# Patient Record
Sex: MALE | Race: WHITE | Employment: OTHER | URBAN - METROPOLITAN AREA
[De-identification: names, ages, dates, MRNs, and addresses within clinical notes are randomized per-mention and may not be internally consistent; named-entity substitution may affect disease eponyms.]

---

## 2019-02-08 ENCOUNTER — HOSPITAL ENCOUNTER (EMERGENCY)
Age: 64
Discharge: LEFT BEFORE DISCHARGE | DRG: 690 | End: 2019-02-08
Attending: EMERGENCY MEDICINE
Payer: MEDICARE

## 2019-02-08 VITALS
TEMPERATURE: 96.7 F | BODY MASS INDEX: 42.66 KG/M2 | HEIGHT: 72 IN | SYSTOLIC BLOOD PRESSURE: 118 MMHG | DIASTOLIC BLOOD PRESSURE: 54 MMHG | OXYGEN SATURATION: 92 % | WEIGHT: 315 LBS | RESPIRATION RATE: 16 BRPM | HEART RATE: 78 BPM

## 2019-02-08 DIAGNOSIS — R53.1 GENERALIZED WEAKNESS: Primary | ICD-10-CM

## 2019-02-08 PROCEDURE — 99283 EMERGENCY DEPT VISIT LOW MDM: CPT

## 2019-02-08 ASSESSMENT — ENCOUNTER SYMPTOMS
WHEEZING: 0
COLOR CHANGE: 0
ABDOMINAL PAIN: 0
SINUS PRESSURE: 0
RHINORRHEA: 0
CONSTIPATION: 0
COUGH: 0
SORE THROAT: 0
DIARRHEA: 0
VOMITING: 0
NAUSEA: 0
SHORTNESS OF BREATH: 0

## 2019-02-08 NOTE — ED NOTES
Bed: 17  Expected date:   Expected time:   Means of arrival:   Comments:  Chad López, [place]  02/08/19 1523

## 2019-02-08 NOTE — ED NOTES
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

## 2019-02-08 NOTE — ED PROVIDER NOTES
Attending Supervisory Note/Shared Visit   I have personally performed a face to face diagnostic evaluation on this patient. I have reviewed the mid-levels findings and agree.         (Please note that portions of this note were completed with a voice recognition program.  Efforts were made to edit the dictations but occasionally words are mis-transcribed.)    Abdiel Arauz MD  Attending Emergency Physician      Abdiel Arauz MD  02/08/19 5685

## 2019-02-09 NOTE — ED PROVIDER NOTES
[address] [place] [address] ED  eMERGENCY dEPARTMENT eNCOUnter      Pt Name: Efren Angeles  MRN: [id number]  [place] 1955  Date of evaluation: 2/8/2019  Provider: Argenis Lee NP, ROMEL - [place] [id number]       Chief Complaint   Patient presents with    Fatigue         HISTORY OF PRESENT ILLNESS  (Location/Symptom, Timing/Onset, Context/Setting, Quality, Duration, Modifying Factors, Severity.)   Efren Angeles is a 70 y.o. male who presents to the emergency department The day by private EMS for evaluation of this. The patient states that she was prior to arrival he was sitting in his chair and he could not stand up. He states that his legs were like Jell-O and he could not put any weight on them or stand up. He states they called 911 and they brought him to the emergency department for evaluation. He did not have any slurred speech. He denies any headache or blurred vision. He states that he has had right-sided facial droop since birth. To that upon arrival to the emergency room his weakness has completely resolved. HE is able to ambulate without any difficulty. Nursing Notes were reviewed. ALLERGIES     Vicodin [hydrocodone-acetaminophen]    CURRENT MEDICATIONS       Discharge Medication List as of 2/8/2019  4:24 PM      CONTINUE these medications which have NOT CHANGED    Details   meloxicam (MOBIC) 7.5 MG tablet Take 1 tablet by mouth daily, Disp-30 tablet, R-3      isosorbide mononitrate (IMDUR) 30 MG CR tablet Take 1 tablet by mouth daily, Disp-30 tablet, R-3      metoprolol (LOPRESSOR) 25 MG tablet Take 1 tablet by mouth 2 times daily, Disp-60 tablet, R-3      furosemide (LASIX) 20 MG tablet Take 1 tablet by mouth daily, Disp-60 tablet, R-3      !!  potassium chloride SA (K-DUR;KLOR-CON M) 20 MEQ tablet Take 1 tablet by mouth 2 times daily (with meals), Disp-60 tablet, R-3      DIGOXIN PO Take 0.125 mg by mouth daily      aspirin 81 MG tablet Take 1 tablet by mouth daily, program.  Efforts were made to edit the dictations but occasionally words are mis-transcribed.)    RCIHIE Bennett NP, APRN - CNP  Certified Nurse Practitioner            ROMEL Wright CNP  02/08/19 6217

## 2019-02-10 ENCOUNTER — APPOINTMENT (OUTPATIENT)
Dept: CT IMAGING | Age: 64
DRG: 690 | End: 2019-02-10
Payer: MEDICARE

## 2019-02-10 ENCOUNTER — APPOINTMENT (OUTPATIENT)
Dept: GENERAL RADIOLOGY | Age: 64
DRG: 690 | End: 2019-02-10
Payer: MEDICARE

## 2019-02-10 ENCOUNTER — HOSPITAL ENCOUNTER (INPATIENT)
Age: 64
LOS: 4 days | Discharge: HOME | DRG: 690 | End: 2019-02-14
Attending: EMERGENCY MEDICINE | Admitting: INTERNAL MEDICINE
Payer: MEDICARE

## 2019-02-10 DIAGNOSIS — E87.1 HYPONATREMIA: ICD-10-CM

## 2019-02-10 DIAGNOSIS — N39.0 URINARY TRACT INFECTION WITHOUT HEMATURIA, SITE UNSPECIFIED: Primary | ICD-10-CM

## 2019-02-10 DIAGNOSIS — I10 HYPERTENSION, UNSPECIFIED TYPE: ICD-10-CM

## 2019-02-10 DIAGNOSIS — E87.6 HYPOKALEMIA: ICD-10-CM

## 2019-02-10 DIAGNOSIS — I48.91 ATRIAL FIBRILLATION WITH RAPID VENTRICULAR RESPONSE (HCC): ICD-10-CM

## 2019-02-10 DIAGNOSIS — I48.20 CHRONIC ATRIAL FIBRILLATION (HCC): ICD-10-CM

## 2019-02-10 PROBLEM — R53.1 GENERALIZED WEAKNESS: Status: ACTIVE | Noted: 2019-02-10

## 2019-02-10 PROBLEM — R09.02 HYPOXEMIA: Status: ACTIVE | Noted: 2019-02-10

## 2019-02-10 PROBLEM — I48.92 ATRIAL FLUTTER WITH RAPID VENTRICULAR RESPONSE (HCC): Status: RESOLVED | Noted: 2019-02-10 | Resolved: 2019-02-10

## 2019-02-10 PROBLEM — N30.01 ACUTE CYSTITIS WITH HEMATURIA: Status: ACTIVE | Noted: 2019-02-10

## 2019-02-10 PROBLEM — I48.92 ATRIAL FLUTTER WITH RAPID VENTRICULAR RESPONSE (HCC): Status: ACTIVE | Noted: 2019-02-10

## 2019-02-10 PROBLEM — E83.42 HYPOMAGNESEMIA: Status: ACTIVE | Noted: 2019-02-10

## 2019-02-10 PROBLEM — D72.829 LEUKOCYTOSIS: Status: ACTIVE | Noted: 2019-02-10

## 2019-02-10 LAB
-: ABNORMAL
ABSOLUTE EOS #: 0 K/UL (ref 0–0.4)
ABSOLUTE IMMATURE GRANULOCYTE: ABNORMAL K/UL (ref 0–0.3)
ABSOLUTE LYMPH #: 0.3 K/UL (ref 1–4.8)
ABSOLUTE MONO #: 0.3 K/UL (ref 0.2–0.8)
ALBUMIN SERPL-MCNC: 3.8 G/DL (ref 3.5–5.2)
ALBUMIN/GLOBULIN RATIO: ABNORMAL (ref 1–2.5)
ALP BLD-CCNC: 95 U/L (ref 40–129)
ALT SERPL-CCNC: 17 U/L (ref 5–41)
AMORPHOUS: ABNORMAL
ANION GAP SERPL CALCULATED.3IONS-SCNC: 18 MMOL/L (ref 9–17)
AST SERPL-CCNC: 67 U/L
BACTERIA: ABNORMAL
BASOPHILS # BLD: 0 %
BASOPHILS ABSOLUTE: 0 K/UL (ref 0–0.2)
BILIRUB SERPL-MCNC: 3.35 MG/DL (ref 0.3–1.2)
BILIRUBIN URINE: NEGATIVE
BNP INTERPRETATION: ABNORMAL
BUN BLDV-MCNC: 19 MG/DL (ref 8–23)
BUN/CREAT BLD: 20 (ref 9–20)
CALCIUM SERPL-MCNC: 9.6 MG/DL (ref 8.6–10.4)
CASTS UA: ABNORMAL /LPF
CHLORIDE BLD-SCNC: 74 MMOL/L (ref 98–107)
CO2: 27 MMOL/L (ref 20–31)
COLOR: YELLOW
COMMENT UA: ABNORMAL
CREAT SERPL-MCNC: 0.97 MG/DL (ref 0.7–1.2)
CRYSTALS, UA: ABNORMAL /HPF
DIFFERENTIAL TYPE: ABNORMAL
DIGOXIN DATE LAST DOSE: ABNORMAL
DIGOXIN DOSE AMOUNT: ABNORMAL
DIGOXIN DOSE TIME: ABNORMAL
DIGOXIN LEVEL: <0.3 NG/ML (ref 0.5–2)
DIRECT EXAM: NORMAL
EOSINOPHILS RELATIVE PERCENT: 0 % (ref 1–4)
EPITHELIAL CELLS UA: ABNORMAL /HPF (ref 0–5)
FIO2: 21
GFR AFRICAN AMERICAN: >60 ML/MIN
GFR NON-AFRICAN AMERICAN: >60 ML/MIN
GFR SERPL CREATININE-BSD FRML MDRD: ABNORMAL ML/MIN/{1.73_M2}
GFR SERPL CREATININE-BSD FRML MDRD: ABNORMAL ML/MIN/{1.73_M2}
GLUCOSE BLD-MCNC: 128 MG/DL (ref 70–99)
GLUCOSE URINE: NEGATIVE
HCO3 VENOUS: 33.2 MMOL/L (ref 24–30)
HCT VFR BLD CALC: 39.5 % (ref 41–53)
HEMOGLOBIN: 13.7 G/DL (ref 13.5–17.5)
IMMATURE GRANULOCYTES: ABNORMAL %
KETONES, URINE: NEGATIVE
LACTIC ACID, SEPSIS WHOLE BLOOD: ABNORMAL MMOL/L (ref 0.5–1.9)
LACTIC ACID, SEPSIS WHOLE BLOOD: NORMAL MMOL/L (ref 0.5–1.9)
LACTIC ACID, SEPSIS: 1.7 MMOL/L (ref 0.5–1.9)
LACTIC ACID, SEPSIS: 3.2 MMOL/L (ref 0.5–1.9)
LEUKOCYTE ESTERASE, URINE: ABNORMAL
LIPASE: 22 U/L (ref 13–60)
LYMPHOCYTES # BLD: 2 % (ref 24–44)
Lab: NORMAL
MAGNESIUM: 1.4 MG/DL (ref 1.6–2.6)
MAGNESIUM: 1.4 MG/DL (ref 1.6–2.6)
MCH RBC QN AUTO: 32.3 PG (ref 26–34)
MCHC RBC AUTO-ENTMCNC: 34.6 G/DL (ref 31–37)
MCV RBC AUTO: 93.6 FL (ref 80–100)
MONOCYTES # BLD: 2 % (ref 1–7)
MORPHOLOGY: ABNORMAL
MUCUS: ABNORMAL
MYOGLOBIN: 1199 NG/ML (ref 28–72)
MYOGLOBIN: 1241 NG/ML (ref 28–72)
NEGATIVE BASE EXCESS, VEN: ABNORMAL (ref 0–2)
NITRITE, URINE: NEGATIVE
NRBC AUTOMATED: ABNORMAL PER 100 WBC
O2 DEVICE/FLOW/%: ABNORMAL
O2 SAT, VEN: 25 %
OTHER OBSERVATIONS UA: ABNORMAL
PATIENT TEMP: 100
PCO2, VEN: 48 MM HG (ref 39–55)
PDW BLD-RTO: 17.5 % (ref 11.5–14.5)
PH UA: 6 (ref 5–8)
PH VENOUS: 7.45 (ref 7.32–7.42)
PLATELET # BLD: 256 K/UL (ref 130–400)
PLATELET ESTIMATE: ABNORMAL
PMV BLD AUTO: 6.1 FL (ref 6–12)
PO2, VEN: 17 MM HG (ref 30–50)
POC PCO2 TEMP: ABNORMAL MM HG
POC PH TEMP: ABNORMAL
POC PO2 TEMP: ABNORMAL MM HG
POSITIVE BASE EXCESS, VEN: 9 (ref 0–2)
POTASSIUM SERPL-SCNC: 2.7 MMOL/L (ref 3.7–5.3)
POTASSIUM SERPL-SCNC: 2.9 MMOL/L (ref 3.7–5.3)
PRO-BNP: 4885 PG/ML
PROTEIN UA: ABNORMAL
RBC # BLD: 4.23 M/UL (ref 4.5–5.9)
RBC # BLD: ABNORMAL 10*6/UL
RBC UA: ABNORMAL /HPF (ref 0–2)
RENAL EPITHELIAL, UA: ABNORMAL /HPF
SEG NEUTROPHILS: 96 % (ref 36–66)
SEGMENTED NEUTROPHILS ABSOLUTE COUNT: 14.5 K/UL (ref 1.8–7.7)
SODIUM BLD-SCNC: 119 MMOL/L (ref 135–144)
SPECIFIC GRAVITY UA: 1.02 (ref 1–1.03)
SPECIMEN DESCRIPTION: NORMAL
TOTAL CK: 918 U/L (ref 39–308)
TOTAL CO2, VENOUS: 35 MMOL/L (ref 25–31)
TOTAL PROTEIN: 8.2 G/DL (ref 6.4–8.3)
TRICHOMONAS: ABNORMAL
TROPONIN INTERP: ABNORMAL
TROPONIN T: ABNORMAL NG/ML
TROPONIN, HIGH SENSITIVITY: 26 NG/L (ref 0–22)
TROPONIN, HIGH SENSITIVITY: 26 NG/L (ref 0–22)
TROPONIN, HIGH SENSITIVITY: 28 NG/L (ref 0–22)
TURBIDITY: ABNORMAL
URINE HGB: ABNORMAL
UROBILINOGEN, URINE: NORMAL
WBC # BLD: 15.1 K/UL (ref 3.5–11)
WBC # BLD: ABNORMAL 10*3/UL
WBC UA: ABNORMAL /HPF (ref 0–5)
YEAST: ABNORMAL

## 2019-02-10 PROCEDURE — 96365 THER/PROPH/DIAG IV INF INIT: CPT

## 2019-02-10 PROCEDURE — 84484 ASSAY OF TROPONIN QUANT: CPT

## 2019-02-10 PROCEDURE — 83880 ASSAY OF NATRIURETIC PEPTIDE: CPT

## 2019-02-10 PROCEDURE — 6370000000 HC RX 637 (ALT 250 FOR IP): Performed by: NURSE PRACTITIONER

## 2019-02-10 PROCEDURE — 84132 ASSAY OF SERUM POTASSIUM: CPT

## 2019-02-10 PROCEDURE — 83735 ASSAY OF MAGNESIUM: CPT

## 2019-02-10 PROCEDURE — 80053 COMPREHEN METABOLIC PANEL: CPT

## 2019-02-10 PROCEDURE — 2580000003 HC RX 258: Performed by: NURSE PRACTITIONER

## 2019-02-10 PROCEDURE — 6360000002 HC RX W HCPCS: Performed by: NURSE PRACTITIONER

## 2019-02-10 PROCEDURE — 82550 ASSAY OF CK (CPK): CPT

## 2019-02-10 PROCEDURE — 82803 BLOOD GASES ANY COMBINATION: CPT

## 2019-02-10 PROCEDURE — 81001 URINALYSIS AUTO W/SCOPE: CPT

## 2019-02-10 PROCEDURE — 87205 SMEAR GRAM STAIN: CPT

## 2019-02-10 PROCEDURE — 80162 ASSAY OF DIGOXIN TOTAL: CPT

## 2019-02-10 PROCEDURE — 85025 COMPLETE CBC W/AUTO DIFF WBC: CPT

## 2019-02-10 PROCEDURE — 99223 1ST HOSP IP/OBS HIGH 75: CPT | Performed by: NURSE PRACTITIONER

## 2019-02-10 PROCEDURE — 6360000004 HC RX CONTRAST MEDICATION: Performed by: NURSE PRACTITIONER

## 2019-02-10 PROCEDURE — 83874 ASSAY OF MYOGLOBIN: CPT

## 2019-02-10 PROCEDURE — 71045 X-RAY EXAM CHEST 1 VIEW: CPT

## 2019-02-10 PROCEDURE — 71260 CT THORAX DX C+: CPT

## 2019-02-10 PROCEDURE — 36415 COLL VENOUS BLD VENIPUNCTURE: CPT

## 2019-02-10 PROCEDURE — 96368 THER/DIAG CONCURRENT INF: CPT

## 2019-02-10 PROCEDURE — 51702 INSERT TEMP BLADDER CATH: CPT

## 2019-02-10 PROCEDURE — 87804 INFLUENZA ASSAY W/OPTIC: CPT

## 2019-02-10 PROCEDURE — 96375 TX/PRO/DX INJ NEW DRUG ADDON: CPT

## 2019-02-10 PROCEDURE — 87070 CULTURE OTHR SPECIMN AEROBIC: CPT

## 2019-02-10 PROCEDURE — 6360000002 HC RX W HCPCS: Performed by: EMERGENCY MEDICINE

## 2019-02-10 PROCEDURE — 2060000000 HC ICU INTERMEDIATE R&B

## 2019-02-10 PROCEDURE — 2500000003 HC RX 250 WO HCPCS: Performed by: EMERGENCY MEDICINE

## 2019-02-10 PROCEDURE — 87040 BLOOD CULTURE FOR BACTERIA: CPT

## 2019-02-10 PROCEDURE — 83605 ASSAY OF LACTIC ACID: CPT

## 2019-02-10 PROCEDURE — 83690 ASSAY OF LIPASE: CPT

## 2019-02-10 PROCEDURE — 99285 EMERGENCY DEPT VISIT HI MDM: CPT

## 2019-02-10 PROCEDURE — 93005 ELECTROCARDIOGRAM TRACING: CPT

## 2019-02-10 PROCEDURE — 2580000003 HC RX 258: Performed by: EMERGENCY MEDICINE

## 2019-02-10 RX ORDER — MAGNESIUM SULFATE 1 G/100ML
1 INJECTION INTRAVENOUS ONCE
Status: COMPLETED | OUTPATIENT
Start: 2019-02-10 | End: 2019-02-10

## 2019-02-10 RX ORDER — 0.9 % SODIUM CHLORIDE 0.9 %
1000 INTRAVENOUS SOLUTION INTRAVENOUS ONCE
Status: DISCONTINUED | OUTPATIENT
Start: 2019-02-10 | End: 2019-02-10

## 2019-02-10 RX ORDER — POTASSIUM CHLORIDE 20 MEQ/1
60 TABLET, EXTENDED RELEASE ORAL ONCE
Status: COMPLETED | OUTPATIENT
Start: 2019-02-10 | End: 2019-02-10

## 2019-02-10 RX ORDER — 0.9 % SODIUM CHLORIDE 0.9 %
80 INTRAVENOUS SOLUTION INTRAVENOUS ONCE
Status: COMPLETED | OUTPATIENT
Start: 2019-02-10 | End: 2019-02-10

## 2019-02-10 RX ORDER — ONDANSETRON 2 MG/ML
4 INJECTION INTRAMUSCULAR; INTRAVENOUS EVERY 6 HOURS PRN
Status: DISCONTINUED | OUTPATIENT
Start: 2019-02-10 | End: 2019-02-10 | Stop reason: SDUPTHER

## 2019-02-10 RX ORDER — ACETAMINOPHEN 325 MG/1
650 TABLET ORAL EVERY 4 HOURS PRN
Status: DISCONTINUED | OUTPATIENT
Start: 2019-02-10 | End: 2019-02-14 | Stop reason: HOSPADM

## 2019-02-10 RX ORDER — ISOSORBIDE MONONITRATE 30 MG/1
30 TABLET, EXTENDED RELEASE ORAL DAILY
Status: DISCONTINUED | OUTPATIENT
Start: 2019-02-11 | End: 2019-02-14 | Stop reason: HOSPADM

## 2019-02-10 RX ORDER — NICOTINE 21 MG/24HR
1 PATCH, TRANSDERMAL 24 HOURS TRANSDERMAL DAILY PRN
Status: DISCONTINUED | OUTPATIENT
Start: 2019-02-10 | End: 2019-02-14 | Stop reason: HOSPADM

## 2019-02-10 RX ORDER — 0.9 % SODIUM CHLORIDE 0.9 %
500 INTRAVENOUS SOLUTION INTRAVENOUS ONCE
Status: DISCONTINUED | OUTPATIENT
Start: 2019-02-10 | End: 2019-02-10

## 2019-02-10 RX ORDER — SODIUM CHLORIDE 9 MG/ML
INJECTION, SOLUTION INTRAVENOUS CONTINUOUS
Status: DISCONTINUED | OUTPATIENT
Start: 2019-02-10 | End: 2019-02-10

## 2019-02-10 RX ORDER — 0.9 % SODIUM CHLORIDE 0.9 %
30 INTRAVENOUS SOLUTION INTRAVENOUS ONCE
Status: COMPLETED | OUTPATIENT
Start: 2019-02-10 | End: 2019-02-10

## 2019-02-10 RX ORDER — POTASSIUM CHLORIDE 7.45 MG/ML
10 INJECTION INTRAVENOUS
Status: COMPLETED | OUTPATIENT
Start: 2019-02-10 | End: 2019-02-10

## 2019-02-10 RX ORDER — MAGNESIUM SULFATE 1 G/100ML
1 INJECTION INTRAVENOUS PRN
Status: DISCONTINUED | OUTPATIENT
Start: 2019-02-10 | End: 2019-02-14 | Stop reason: HOSPADM

## 2019-02-10 RX ORDER — POTASSIUM CHLORIDE 7.45 MG/ML
10 INJECTION INTRAVENOUS PRN
Status: DISCONTINUED | OUTPATIENT
Start: 2019-02-10 | End: 2019-02-14 | Stop reason: HOSPADM

## 2019-02-10 RX ORDER — ONDANSETRON 2 MG/ML
4 INJECTION INTRAMUSCULAR; INTRAVENOUS EVERY 6 HOURS PRN
Status: DISCONTINUED | OUTPATIENT
Start: 2019-02-10 | End: 2019-02-14 | Stop reason: HOSPADM

## 2019-02-10 RX ORDER — ASPIRIN 81 MG/1
81 TABLET ORAL DAILY
Status: DISCONTINUED | OUTPATIENT
Start: 2019-02-11 | End: 2019-02-14 | Stop reason: HOSPADM

## 2019-02-10 RX ORDER — DILTIAZEM HYDROCHLORIDE 5 MG/ML
5 INJECTION INTRAVENOUS ONCE
Status: COMPLETED | OUTPATIENT
Start: 2019-02-10 | End: 2019-02-10

## 2019-02-10 RX ORDER — SODIUM CHLORIDE 0.9 % (FLUSH) 0.9 %
10 SYRINGE (ML) INJECTION PRN
Status: DISCONTINUED | OUTPATIENT
Start: 2019-02-10 | End: 2019-02-10

## 2019-02-10 RX ORDER — SODIUM CHLORIDE 0.9 % (FLUSH) 0.9 %
10 SYRINGE (ML) INJECTION EVERY 12 HOURS SCHEDULED
Status: DISCONTINUED | OUTPATIENT
Start: 2019-02-10 | End: 2019-02-10

## 2019-02-10 RX ORDER — POTASSIUM CHLORIDE 20 MEQ/1
40 TABLET, EXTENDED RELEASE ORAL PRN
Status: DISCONTINUED | OUTPATIENT
Start: 2019-02-10 | End: 2019-02-14 | Stop reason: HOSPADM

## 2019-02-10 RX ORDER — METOPROLOL TARTRATE 5 MG/5ML
5 INJECTION INTRAVENOUS EVERY 4 HOURS PRN
Status: DISCONTINUED | OUTPATIENT
Start: 2019-02-10 | End: 2019-02-14 | Stop reason: HOSPADM

## 2019-02-10 RX ORDER — FUROSEMIDE 20 MG/1
20 TABLET ORAL DAILY
Status: DISCONTINUED | OUTPATIENT
Start: 2019-02-11 | End: 2019-02-14 | Stop reason: HOSPADM

## 2019-02-10 RX ORDER — METOLAZONE 2.5 MG/1
2.5 TABLET ORAL DAILY
Status: DISCONTINUED | OUTPATIENT
Start: 2019-02-11 | End: 2019-02-11

## 2019-02-10 RX ORDER — POTASSIUM CHLORIDE 20MEQ/15ML
40 LIQUID (ML) ORAL PRN
Status: DISCONTINUED | OUTPATIENT
Start: 2019-02-10 | End: 2019-02-14 | Stop reason: HOSPADM

## 2019-02-10 RX ORDER — ALBUTEROL SULFATE 2.5 MG/3ML
2.5 SOLUTION RESPIRATORY (INHALATION)
Status: DISCONTINUED | OUTPATIENT
Start: 2019-02-10 | End: 2019-02-11

## 2019-02-10 RX ORDER — COLCHICINE 0.6 MG/1
0.6 CAPSULE ORAL DAILY
Status: DISCONTINUED | OUTPATIENT
Start: 2019-02-11 | End: 2019-02-12

## 2019-02-10 RX ORDER — ONDANSETRON 4 MG/1
4 TABLET, ORALLY DISINTEGRATING ORAL EVERY 6 HOURS PRN
Status: DISCONTINUED | OUTPATIENT
Start: 2019-02-10 | End: 2019-02-14 | Stop reason: HOSPADM

## 2019-02-10 RX ORDER — 0.9 % SODIUM CHLORIDE 0.9 %
30 INTRAVENOUS SOLUTION INTRAVENOUS ONCE
Status: DISCONTINUED | OUTPATIENT
Start: 2019-02-10 | End: 2019-02-10

## 2019-02-10 RX ADMIN — MAGNESIUM SULFATE HEPTAHYDRATE 1 G: 1 INJECTION, SOLUTION INTRAVENOUS at 19:28

## 2019-02-10 RX ADMIN — ENOXAPARIN SODIUM 135 MG: 150 INJECTION SUBCUTANEOUS at 23:04

## 2019-02-10 RX ADMIN — MAGNESIUM SULFATE HEPTAHYDRATE 1 G: 1 INJECTION, SOLUTION INTRAVENOUS at 23:05

## 2019-02-10 RX ADMIN — METOPROLOL TARTRATE 25 MG: 25 TABLET ORAL at 23:05

## 2019-02-10 RX ADMIN — IOPAMIDOL 75 ML: 755 INJECTION, SOLUTION INTRAVENOUS at 20:09

## 2019-02-10 RX ADMIN — POTASSIUM CHLORIDE 10 MEQ: 7.46 INJECTION, SOLUTION INTRAVENOUS at 19:28

## 2019-02-10 RX ADMIN — SODIUM CHLORIDE 80 ML: 0.9 INJECTION, SOLUTION INTRAVENOUS at 20:09

## 2019-02-10 RX ADMIN — CEFTRIAXONE SODIUM 1 G: 1 INJECTION, POWDER, FOR SOLUTION INTRAMUSCULAR; INTRAVENOUS at 23:05

## 2019-02-10 RX ADMIN — POTASSIUM CHLORIDE 60 MEQ: 20 TABLET, EXTENDED RELEASE ORAL at 23:04

## 2019-02-10 RX ADMIN — SODIUM CHLORIDE: 9 INJECTION, SOLUTION INTRAVENOUS at 19:40

## 2019-02-10 RX ADMIN — CEFEPIME 2 G: 2 INJECTION, POWDER, FOR SOLUTION INTRAVENOUS at 19:28

## 2019-02-10 RX ADMIN — DILTIAZEM HYDROCHLORIDE 5 MG: 5 INJECTION INTRAVENOUS at 20:51

## 2019-02-10 RX ADMIN — SODIUM CHLORIDE, PRESERVATIVE FREE 10 ML: 5 INJECTION INTRAVENOUS at 20:10

## 2019-02-10 RX ADMIN — SODIUM CHLORIDE 4083 ML: 9 INJECTION, SOLUTION INTRAVENOUS at 18:02

## 2019-02-10 RX ADMIN — POTASSIUM CHLORIDE 10 MEQ: 7.46 INJECTION, SOLUTION INTRAVENOUS at 20:36

## 2019-02-10 ASSESSMENT — ENCOUNTER SYMPTOMS
ABDOMINAL PAIN: 0
NAUSEA: 0
VOMITING: 0
COUGH: 0
SHORTNESS OF BREATH: 0

## 2019-02-10 ASSESSMENT — PAIN SCALES - GENERAL: PAINLEVEL_OUTOF10: 0

## 2019-02-10 NOTE — ED PROVIDER NOTES
[address] [place] [address] ED  eMERGENCY dEPARTMENT eNCOUnter      Pt Name: Alicia Ellis  MRN: [id number]  [place] 1955  Date of evaluation: 2/10/2019  Provider: ROMEL Gonzales CNP    CHIEF COMPLAINT       Chief Complaint   Patient presents with    Extremity Weakness    Fall         HISTORY OFPRESENT ILLNESS  (Location/Symptom, Timing/Onset, Context/Setting, Quality, Duration, Modifying Factors, Severity.)   Alicia Ellis is a 70 y.o. male who presents to the emergency department By EMS for evaluation of generalized weakness and fall. Patient states His legs gave out on him when he was in the shower today and he went down landing on his back. He denies striking his head. No loss of consciousness. He was down in the shower for approximately 30 minutes. Patient states he called 911. He states his brother helped get him off the floor. Also complains of having chills for the past several days. He denies chest pain, shortness of breath, abdominal pain, nausea or vomiting. 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 He also has a chronic ulcer on his right lower leg. He also has history of atrial fibrillation and hypertension. Nursing Notes were reviewed. PASTMEDICAL HISTORY     Past Medical History:   Diagnosis Date    Atrial fibrillation ([address]     Gout     Hypertension     Leg wound, right     ?  osteomyelitis         SURGICAL HISTORY       Past Surgical History:   Procedure Laterality Date    CARDIAC VALVE REPLACEMENT      pig valve    CHOLECYSTECTOMY  10/2/2015    lap dedra turned open         CURRENT MEDICATIONS     Previous Medications    ASPIRIN 81 MG TABLET    Take 1 tablet by mouth daily    COLCHICINE (COLCRYS) 0.6 MG TABLET    Take 1 tablet by mouth daily    DIGOXIN PO    Take 0.125 mg by mouth daily    FUROSEMIDE (LASIX) 20 MG TABLET    Take 1 tablet by mouth daily    ISOSORBIDE MONONITRATE (IMDUR) 30 MG CR TABLET    Take 1 tablet by mouth daily MELOXICAM (MOBIC) 7.5 MG TABLET    Take 1 tablet by mouth daily    METOLAZONE (ZAROXOLYN) 10 MG TABLET    Take 0.5 tablets by mouth daily    METOPROLOL (LOPRESSOR) 25 MG TABLET    Take 1 tablet by mouth 2 times daily    POTASSIUM CHLORIDE SA (K-DUR;KLOR-CON M) 20 MEQ TABLET    Take 1 tablet by mouth daily    POTASSIUM CHLORIDE SA (K-DUR;KLOR-CON M) 20 MEQ TABLET    Take 1 tablet by mouth 2 times daily (with meals)       ALLERGIES     Vicodin [hydrocodone-acetaminophen]    FAMILY HISTORY     History reviewed. No pertinent family history. SOCIAL HISTORY       Social History     Social History    Marital status: Single     Spouse name: N/A    Number of children: N/A    Years of education: N/A     Social History Main Topics    Smoking status: Never Smoker    Smokeless tobacco: Never Used    Alcohol use Yes      Comment: socially    Drug use: No    Sexual activity: Not Asked     Other Topics Concern    None     Social History Narrative    None         REVIEW OF SYSTEMS    (2-9 systems for level 4, 10 or more for level 5)     Review of Systems   Constitutional: Positive for chills. Respiratory: Negative for cough and shortness of breath. Cardiovascular: Negative for chest pain. Gastrointestinal: Negative for abdominal pain, nausea and vomiting. Neurological: Negative for dizziness, speech difficulty, weakness, light-headedness and headaches. All other systems reviewed and are negative. Except as noted above the remainder of the review of systems was reviewed and negative. PHYSICAL EXAM    (up to 7 for level 4, 8 or more for level 5)     ED Triage Vitals [02/10/19 1706]   BP Temp Temp Source Pulse Resp SpO2 Height Weight   (!) 131/53 100 °F (37.8 °C) Oral 115 20 (!) 83 % 6' (1.829 m) 300 lb (136.1 kg)       Physical Exam   Constitutional: He is oriented to person, place, and time. He appears well-developed and well-nourished. HENT:   Head: Normocephalic and atraumatic.    Right Ear: Hearing and external ear normal.   Left Ear: Hearing and external ear normal.   Nose: Nose normal.   Mouth/Throat: Uvula is midline, oropharynx is clear and moist and mucous membranes are normal.   Eyes: Pupils are equal, round, and reactive to light. Conjunctivae, EOM and lids are normal.   Neck: Normal range of motion and full passive range of motion without pain. Neck supple. No spinous process tenderness and no muscular tenderness present. Cardiovascular: S1 normal, S2 normal, normal heart sounds, intact distal pulses and normal pulses. An irregular rhythm present. Tachycardia present. Pulmonary/Chest: Effort normal and breath sounds normal.   Abdominal: Soft. Normal appearance and bowel sounds are normal. There is no tenderness. Musculoskeletal: Normal range of motion. Neurological: He is alert and oriented to person, place, and time. He has normal strength. No sensory deficit. GCS eye subscore is 4. GCS verbal subscore is 5. GCS motor subscore is 6. Patient is alert and oriented ×3. He exhibits right facial droop, which he states he has had since birth. No new cranial nerve deficit. He exhibits 5 over 5 equal strength upper and lower extremities. No paresthesia. Skin: Skin is warm, dry and intact. Capillary refill takes less than 2 seconds. Psychiatric: He has a normal mood and affect.  His behavior is normal. Judgment and thought content normal.         DIAGNOSTIC RESULTS     EKG:All EKG's are interpreted by the Emergency Department Physician who either signs or Co-signs this chart in the absence of a cardiologist.    EKG interpreted by attending physician    RADIOLOGY:   Non-plain film images such as CT, Ultrasound and MRI are read by theradiologist. Plain radiographic images are visualized and preliminarily interpreted by the emergency physician with the below findings:    Xr Chest Portable    Result Date: 2/10/2019  EXAMINATION: SINGLE XRAY VIEW OF THE CHEST 2/10/2019 5:43 pm COMPARISON: January 18, 2016 HISTORY: ORDERING SYSTEM PROVIDED HISTORY: SOB TECHNOLOGIST PROVIDED HISTORY: SOB Ordering Physician Provided Reason for Exam: sob Acuity: Acute Type of Exam: Initial FINDINGS: Moderate cardiomegaly unchanged. Mild edema. Sternotomy wires noted. Bony thorax intact. Mild CHF     Ct Chest Pulmonary Embolism W Contrast    Result Date: 2/10/2019  EXAMINATION: CTA OF THE CHEST 2/10/2019 8:10 pm TECHNIQUE: CTA of the chest was performed after the administration of intravenous contrast.  Multiplanar reformatted images are provided for review. MIP images are provided for review. Dose modulation, iterative reconstruction, and/or weight based adjustment of the mA/kV was utilized to reduce the radiation dose to as low as reasonably achievable. COMPARISON: None. HISTORY: ORDERING SYSTEM PROVIDED HISTORY: Shortness of breath, tachycardia, rule out PE FINDINGS: Pulmonary Arteries: Pulmonary arteries are adequately opacified for evaluation. No evidence of intraluminal filling defect to suggest pulmonary embolism. Main pulmonary artery is normal in caliber. Mediastinum: No evidence of mediastinal lymphadenopathy. The heart and pericardium demonstrate no acute abnormality. There is no acute abnormality of the thoracic aorta. Prosthetic heart valve. Moderate cardiomegaly. Coronary artery disease. Sternotomy wires. Lungs/pleura: There is a mild increase in interstitial markings in congestion. Upper Abdomen: Limited images of the upper abdomen are unremarkable. Soft Tissues/Bones: No acute bone or soft tissue abnormality. Mild CHF. Interpretation per the Radiologist below, if available at the time of this note:    CT CHEST PULMONARY EMBOLISM W CONTRAST   Final Result   Mild CHF.          XR CHEST PORTABLE   Final Result   Mild CHF               EDBEDSIDE ULTRASOUND:   Performed by Chidi Childers - none    LABS:  [unfilled]    All other labs were within normal range or not returned as of this dictation. EMERGENCY DEPARTMENT COURSE andDIFFERENTIAL DIAGNOSIS/MDM:   Patient was evaluated in conjunction with attending physician. An indwelling Vail catheter was placed in the emergency department by the nurse. Urinalysis shows  white blood cells. Urine sent for culture. Labs reviewed. WBC 15.1. Sodium 119. Potassium 2.9. Chloride 74. Magnesium 1.5. Initial lactic acid 3.2. Initial troponin 26. Patient has a blood pressure 131/53 with a heart rate of 1:15 and a temperature 100 upon arrival.  SPO2 is 83% on room air. He was placed on 2 L nasal cannula SPO2 is 95%. Patient was given a 30 mL/kg ideal body weight bolus of normal saline, which was 2,328 mls. He was started on cefepime. He was also given potassium and magnesium IV supplements. CT chest per radiologist shows pulmonary arteries are adequately opacified for evaluation. No evidence of intraluminal filling defect to suggest pulmonary embolism. Main pulmonary artery is normal in caliber. Mediastinum: No evidence of mediastinal lymphadenopathy. The heart and pericardium demonstrate no acute abnormality. There is no acute abnormality of the thoracic aorta. Prosthetic heart valve. Moderate cardiomegaly. Coronary artery disease. Sternotomy wires. Lungs/pleura: There is a mild increase in interstitial markings in congestion. Patient will be admitted for further evaluation. CRITICAL CARE TIME     Due to the high probability of sudden and clinically significant deterioration in the patient's condition he required highest level of my preparedness to intervene urgently. I provided critical care time including documentation time, medication orders and management, reevaluation, vital sign assessment, ordering and reviewing of of lab tests ordering and reviewing of x-ray studies, and admission orders.  Aggregate critical care time is 45 minutes including only time during which I was engaged in work directly related to his care and did not include time spent treating other patients simultaneously. Vitals:    Vitals:    02/10/19 1941 02/10/19 2032 02/10/19 2038 02/10/19 2053   BP: 105/70  103/68 104/67   Pulse: 115 108 111 106   Resp:       Temp:       TempSrc:       SpO2: 95% 94% 94% 95%   Weight:       Height:             CONSULTS:  IP CONSULT TO HOSPITALIST  IP CONSULT TO CARDIOLOGY    PROCEDURES:  Procedures    FINAL IMPRESSION      1. Urinary tract infection without hematuria, site unspecified    2. Hyponatremia    3. Hypokalemia    4. Atrial fibrillation with rapid ventricular response ([facility] [address]    5.  Hypertension, unspecified type          DISPOSITION/PLAN   DISPOSITION        PATIENT REFERRED TO:   ROMEL Hodges CNP  [facility]  [phone]            DISCHARGE MEDICATIONS:     New Prescriptions    No medications on file     Electronically signed by ROMEL Hagan 2/10/2019 at 8:59 PM            ROMEL Hagan CNP  02/10/19 2102

## 2019-02-10 NOTE — LETTER
Beneficiary Notification Letter     This [place] Provider is Participating in an Innovative Payment and [address] [place] [address] [place] from Medicare     Greetings:   Romeo Sebastian is participating in a Medicare initiative called the [facility] for [address] [facility]. You are receiving this letter because your health care provider has identified you as a patient who is receiving care through this initiative. Health care providers participating in the [facility] [address] [facility], including Romeo Sebastian, will work with Medicare to improve care for patients. Your Medicare rights have not been changed. You still have all the same Medicare rights and protections, including the right to choose which hospital, doctor, or other health care provider you see. However, because Romeo Sebastian chose to participate in the [address] [place] [address], all Medicare beneficiaries who meet the eligibility criteria of this initiative will receive care under the initiative. If you do not wish to receive care under the [employer] [facility] [address] [facility], you must choose a health care provider that does not participate in this initiative for your care. Regardless of which health care provider you see, Medicare will continue to cover all of your medically necessary services. Bundled Payments for Care Improvement Advanced aims to help improve your care     The [employer] [facility] [address] [facility] is an innovative Medicare initiative that encourages your doctors, hospitals, and other health care providers to work more closely together so you get better care during and following certain hospital stays.  In this initiative, doctors and hospitals may work closely with certain health care providers and suppliers that help patients recover after discharge from the hospital, · To find a different doctor, visit Medicare's Physician Compare website, HDTapes.co.nz, or call 1-800-MEDICARE (468 5744). TTY users should call 9-367.334.1867. · To find a different skilled nursing facility, visit [address] [facility] [address] website, https://www.[facility]/, or call 1-800-MEDICARE (1- 867.630.3769). TTY users should call 9-465.336.6190. · To find a different long term care hospital, visit [facility] [address] Compare website, [employer], or call 1-800- MEDICARE (464 5560). TTY users should call 1-261.859.6646. · To find a different inpatient rehabilitation facility, visit [address] [facility] [address] Compare website, www.medicare.gov/ inpatientrehabilitation facilitycompare, or call 1-800-MEDICARE (7-680.242.3424). TTY users should call 5- 843.457.9984. · To find a different home health agency, visit [address] [facility] website, www.medicare.gov/homehealthcompare, or call 1-800-MEDICARE (8-014- 426-6543). TTY users should call 7-969.481.7013.

## 2019-02-11 PROBLEM — I50.9 ACUTE CONGESTIVE HEART FAILURE (HCC): Status: ACTIVE | Noted: 2019-02-11

## 2019-02-11 PROBLEM — I50.22 CHRONIC SYSTOLIC (CONGESTIVE) HEART FAILURE (HCC): Status: ACTIVE | Noted: 2019-02-11

## 2019-02-11 LAB
ALBUMIN SERPL-MCNC: 3.1 G/DL (ref 3.5–5.2)
ALBUMIN/GLOBULIN RATIO: ABNORMAL (ref 1–2.5)
ALP BLD-CCNC: 64 U/L (ref 40–129)
ALT SERPL-CCNC: 15 U/L (ref 5–41)
AST SERPL-CCNC: 57 U/L
BILIRUB SERPL-MCNC: 1.99 MG/DL (ref 0.3–1.2)
BILIRUBIN DIRECT: 1.01 MG/DL
BILIRUBIN, INDIRECT: 0.98 MG/DL (ref 0–1)
BNP INTERPRETATION: ABNORMAL
BUN BLDV-MCNC: 17 MG/DL (ref 8–23)
CALCIUM SERPL-MCNC: 8.2 MG/DL (ref 8.6–10.4)
CHLORIDE BLD-SCNC: 78 MMOL/L (ref 98–107)
CO2: 26 MMOL/L (ref 20–31)
CREAT SERPL-MCNC: 0.87 MG/DL (ref 0.7–1.2)
EKG ATRIAL RATE: 107 BPM
EKG Q-T INTERVAL: 354 MS
EKG QRS DURATION: 118 MS
EKG QTC CALCULATION (BAZETT): 481 MS
EKG R AXIS: 87 DEGREES
EKG T AXIS: 45 DEGREES
EKG VENTRICULAR RATE: 111 BPM
GFR AFRICAN AMERICAN: >60 ML/MIN
GFR NON-AFRICAN AMERICAN: >60 ML/MIN
GFR SERPL CREATININE-BSD FRML MDRD: NORMAL ML/MIN/{1.73_M2}
GFR SERPL CREATININE-BSD FRML MDRD: NORMAL ML/MIN/{1.73_M2}
GLOBULIN: ABNORMAL G/DL (ref 1.5–3.8)
GLUCOSE BLD-MCNC: 135 MG/DL (ref 70–99)
HCT VFR BLD CALC: 31.9 % (ref 41–53)
HEMOGLOBIN: 11.4 G/DL (ref 13.5–17.5)
LV EF: 30 %
LVEF MODALITY: NORMAL
MAGNESIUM: 1.7 MG/DL (ref 1.6–2.6)
MCH RBC QN AUTO: 32.6 PG (ref 26–34)
MCHC RBC AUTO-ENTMCNC: 35.7 G/DL (ref 31–37)
MCV RBC AUTO: 91.4 FL (ref 80–100)
NRBC AUTOMATED: ABNORMAL PER 100 WBC
OSMOLALITY URINE: 379 MOSM/KG (ref 300–1170)
PDW BLD-RTO: 16.8 % (ref 11.5–14.5)
PLATELET # BLD: 215 K/UL (ref 130–400)
PMV BLD AUTO: 7.3 FL (ref 6–12)
POTASSIUM SERPL-SCNC: 2.8 MMOL/L (ref 3.7–5.3)
POTASSIUM SERPL-SCNC: 3.4 MMOL/L (ref 3.7–5.3)
PRO-BNP: 6908 PG/ML
RBC # BLD: 3.49 M/UL (ref 4.5–5.9)
SERUM OSMOLALITY: 256 MOSM/KG (ref 282–298)
SODIUM BLD-SCNC: 120 MMOL/L (ref 135–144)
SODIUM BLD-SCNC: 122 MMOL/L (ref 135–144)
SODIUM BLD-SCNC: 123 MMOL/L (ref 135–144)
SODIUM,UR: <20 MMOL/L
TOTAL PROTEIN: 6.5 G/DL (ref 6.4–8.3)
TROPONIN INTERP: ABNORMAL
TROPONIN T: ABNORMAL NG/ML
TROPONIN, HIGH SENSITIVITY: 29 NG/L (ref 0–22)
TSH SERPL DL<=0.05 MIU/L-ACNC: 1.29 MIU/L (ref 0.3–5)
WBC # BLD: 12.9 K/UL (ref 3.5–11)

## 2019-02-11 PROCEDURE — 97162 PT EVAL MOD COMPLEX 30 MIN: CPT

## 2019-02-11 PROCEDURE — 82310 ASSAY OF CALCIUM: CPT

## 2019-02-11 PROCEDURE — 6370000000 HC RX 637 (ALT 250 FOR IP): Performed by: NURSE PRACTITIONER

## 2019-02-11 PROCEDURE — 84520 ASSAY OF UREA NITROGEN: CPT

## 2019-02-11 PROCEDURE — 97166 OT EVAL MOD COMPLEX 45 MIN: CPT

## 2019-02-11 PROCEDURE — 82435 ASSAY OF BLOOD CHLORIDE: CPT

## 2019-02-11 PROCEDURE — 82565 ASSAY OF CREATININE: CPT

## 2019-02-11 PROCEDURE — 85027 COMPLETE CBC AUTOMATED: CPT

## 2019-02-11 PROCEDURE — 2060000000 HC ICU INTERMEDIATE R&B

## 2019-02-11 PROCEDURE — 82374 ASSAY BLOOD CARBON DIOXIDE: CPT

## 2019-02-11 PROCEDURE — 82947 ASSAY GLUCOSE BLOOD QUANT: CPT

## 2019-02-11 PROCEDURE — 97530 THERAPEUTIC ACTIVITIES: CPT

## 2019-02-11 PROCEDURE — 97535 SELF CARE MNGMENT TRAINING: CPT

## 2019-02-11 PROCEDURE — 93306 TTE W/DOPPLER COMPLETE: CPT

## 2019-02-11 PROCEDURE — 99233 SBSQ HOSP IP/OBS HIGH 50: CPT | Performed by: INTERNAL MEDICINE

## 2019-02-11 PROCEDURE — 83735 ASSAY OF MAGNESIUM: CPT

## 2019-02-11 PROCEDURE — 83935 ASSAY OF URINE OSMOLALITY: CPT

## 2019-02-11 PROCEDURE — 2580000003 HC RX 258: Performed by: NURSE PRACTITIONER

## 2019-02-11 PROCEDURE — 6360000002 HC RX W HCPCS: Performed by: NURSE PRACTITIONER

## 2019-02-11 PROCEDURE — 84443 ASSAY THYROID STIM HORMONE: CPT

## 2019-02-11 PROCEDURE — 84132 ASSAY OF SERUM POTASSIUM: CPT

## 2019-02-11 PROCEDURE — 97116 GAIT TRAINING THERAPY: CPT

## 2019-02-11 PROCEDURE — 36415 COLL VENOUS BLD VENIPUNCTURE: CPT

## 2019-02-11 PROCEDURE — 6370000000 HC RX 637 (ALT 250 FOR IP): Performed by: INTERNAL MEDICINE

## 2019-02-11 PROCEDURE — 83930 ASSAY OF BLOOD OSMOLALITY: CPT

## 2019-02-11 PROCEDURE — 80076 HEPATIC FUNCTION PANEL: CPT

## 2019-02-11 PROCEDURE — 84484 ASSAY OF TROPONIN QUANT: CPT

## 2019-02-11 PROCEDURE — 84295 ASSAY OF SERUM SODIUM: CPT

## 2019-02-11 PROCEDURE — 84300 ASSAY OF URINE SODIUM: CPT

## 2019-02-11 PROCEDURE — 83880 ASSAY OF NATRIURETIC PEPTIDE: CPT

## 2019-02-11 RX ORDER — ISOSORBIDE MONONITRATE 60 MG/1
60 TABLET, EXTENDED RELEASE ORAL DAILY
Status: ON HOLD | COMMUNITY
End: 2019-02-14 | Stop reason: HOSPADM

## 2019-02-11 RX ORDER — POTASSIUM CHLORIDE 7.45 MG/ML
10 INJECTION INTRAVENOUS
Status: COMPLETED | OUTPATIENT
Start: 2019-02-11 | End: 2019-02-11

## 2019-02-11 RX ORDER — FUROSEMIDE 40 MG/1
40 TABLET ORAL DAILY
Status: ON HOLD | COMMUNITY
End: 2019-02-14 | Stop reason: HOSPADM

## 2019-02-11 RX ORDER — METOPROLOL TARTRATE 50 MG/1
50 TABLET, FILM COATED ORAL DAILY
Status: ON HOLD | COMMUNITY
End: 2019-02-14 | Stop reason: HOSPADM

## 2019-02-11 RX ORDER — POTASSIUM CHLORIDE 20 MEQ/1
40 TABLET, EXTENDED RELEASE ORAL 2 TIMES DAILY WITH MEALS
Status: DISCONTINUED | OUTPATIENT
Start: 2019-02-11 | End: 2019-02-14 | Stop reason: HOSPADM

## 2019-02-11 RX ORDER — COLCHICINE 0.6 MG/1
0.6 TABLET ORAL DAILY PRN
COMMUNITY
End: 2019-02-18 | Stop reason: SDUPTHER

## 2019-02-11 RX ORDER — METOLAZONE 2.5 MG/1
2.5 TABLET ORAL DAILY
Status: ON HOLD | COMMUNITY
End: 2019-02-14 | Stop reason: HOSPADM

## 2019-02-11 RX ORDER — ALLOPURINOL 100 MG/1
100 TABLET ORAL DAILY
COMMUNITY
End: 2019-02-18 | Stop reason: SDUPTHER

## 2019-02-11 RX ADMIN — APIXABAN 5 MG: 5 TABLET, FILM COATED ORAL at 17:31

## 2019-02-11 RX ADMIN — ISOSORBIDE MONONITRATE 30 MG: 30 TABLET ORAL at 08:28

## 2019-02-11 RX ADMIN — ASPIRIN 81 MG: 81 TABLET, COATED ORAL at 08:28

## 2019-02-11 RX ADMIN — METOPROLOL TARTRATE 25 MG: 25 TABLET ORAL at 21:30

## 2019-02-11 RX ADMIN — FUROSEMIDE 20 MG: 20 TABLET ORAL at 08:28

## 2019-02-11 RX ADMIN — POTASSIUM CHLORIDE 10 MEQ: 7.46 INJECTION, SOLUTION INTRAVENOUS at 04:12

## 2019-02-11 RX ADMIN — POTASSIUM CHLORIDE 40 MEQ: 20 TABLET, EXTENDED RELEASE ORAL at 04:13

## 2019-02-11 RX ADMIN — POTASSIUM CHLORIDE 40 MEQ: 20 TABLET, EXTENDED RELEASE ORAL at 17:31

## 2019-02-11 RX ADMIN — POTASSIUM CHLORIDE 40 MEQ: 20 TABLET, EXTENDED RELEASE ORAL at 12:19

## 2019-02-11 RX ADMIN — CEFTRIAXONE SODIUM 1 G: 1 INJECTION, POWDER, FOR SOLUTION INTRAMUSCULAR; INTRAVENOUS at 21:30

## 2019-02-11 RX ADMIN — ENOXAPARIN SODIUM 135 MG: 150 INJECTION SUBCUTANEOUS at 08:27

## 2019-02-11 RX ADMIN — POTASSIUM CHLORIDE 10 MEQ: 7.46 INJECTION, SOLUTION INTRAVENOUS at 05:16

## 2019-02-11 RX ADMIN — METOPROLOL TARTRATE 25 MG: 25 TABLET ORAL at 08:29

## 2019-02-11 RX ADMIN — METOLAZONE 2.5 MG: 2.5 TABLET ORAL at 08:28

## 2019-02-11 RX ADMIN — COLCHICINE 0.6 MG: 0.6 CAPSULE ORAL at 08:28

## 2019-02-11 ASSESSMENT — PAIN SCALES - GENERAL
PAINLEVEL_OUTOF10: 0
PAINLEVEL_OUTOF10: 3

## 2019-02-11 ASSESSMENT — PAIN DESCRIPTION - LOCATION: LOCATION: BACK

## 2019-02-11 ASSESSMENT — PAIN DESCRIPTION - PAIN TYPE: TYPE: CHRONIC PAIN

## 2019-02-11 NOTE — FLOWSHEET NOTE
Patient was sitting up in his chair as writer entered the room. Patient engaged in conversation and shared he is feeling much better. Patient stated he has kidney infection that seems to be healing with the medication and hopes the go home soon. Patient has a peaceful and pleasant spirit. 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 The writer stated he will pray for continued comfort and rest during his stay. Patient is grateful for the visit and support of spiritual care. Spiritual care will follow up as needed or requested. 02/11/19 3947   Encounter Summary   Services provided to: Patient   Referral/Consult From: [address] [facility] Family members   Place of [facility] none   Continue Visiting (2/11/2019)   Complexity of Encounter Low   Length of Encounter 15 minutes   Spiritual Assessment Completed Yes   Routine   Type Initial   Assessment Calm; Approachable   Intervention Active listening   Outcome Expressed gratitude

## 2019-02-11 NOTE — PLAN OF CARE
Problem: Falls - Risk of:  Goal: Absence of physical injury  Absence of physical injury   Outcome: Ongoing  Pt assessed for fall risk. Call bell within reach. Non skid socks on. Clutter removed . Bed level lowered . Bed rail used 2/4. Room lights and bed side table near the bed.

## 2019-02-11 NOTE — ED NOTES
Pt returned from CT, water provided, repositioned in the bed for comfort.        Donny Weir RN  02/10/19 2012

## 2019-02-11 NOTE — PROGRESS NOTES
Physical Therapy    Facility/Department: [facility] PROGRESSIVE CARE  Initial Assessment    NAME: Sheela Gann  : 1955  MRN: [id number]    Date of Service: 2019    Discharge Recommendations:  Home with Home health PT     Sheela Gann is a 70 y.o. male who presents to the emergency department By [facility] for evaluation of generalized weakness and fall. Patient states His legs gave out on him when he was in the shower today and he went down landing on his back. He denies striking his head. No loss of consciousness. He was down in the shower for approximately 30 minutes. Patient states he called 911. He states his brother helped get him off the floor. Also complains of having chills for the past several days. He denies chest pain, shortness of breath, abdominal pain, nausea or vomiting. 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 He also has a chronic ulcer on his right lower leg. He also has history of atrial fibrillation and hypertension. Patient Diagnosis(es): The primary encounter diagnosis was Urinary tract infection without hematuria, site unspecified. Diagnoses of Hyponatremia, Hypokalemia, Atrial fibrillation with rapid ventricular response ([address] and Hypertension, unspecified type were also pertinent to this visit. has a past medical history of Atrial fibrillation ([address] Gout; Hypertension; and Leg wound, right.   has a past surgical history that includes Cardiac valve replacement and Cholecystectomy (10/2/2015).     Restrictions  Restrictions/Precautions  Restrictions/Precautions: Fall Risk, General Precautions  Vision/Hearing  Vision: Within Functional Limits  Hearing: Within functional limits     Subjective  General  Chart Reviewed: Yes  Patient assessed for rehabilitation services?: Yes  Family / Caregiver Present: No  Follows Commands: Within Functional Limits  Pain Screening  Patient Currently in Pain: Yes Orientation  Orientation  Overall Orientation Status: Within Functional Limits  Social/Functional History  Social/Functional History  Lives With: Family (mother)  Type of Home: House  Home Layout: One level, Laundry in basement  Home Access: Stairs to enter with rails (2+5)  Entrance Stairs - Rails: Right  Bathroom Shower/Tub: Walk-in shower (in basement)  Bathroom Toilet: Handicap height  Home Equipment: Rolling walker, Cane (unsure if bariatric RW)  ADL Assistance: Independent  Homemaking Assistance: Independent (pt does cooking/laundry for mother. 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 falls (2 falls in last 2 days)  Cognition        Objective     Observation/Palpation  Posture: Good  Observation: Wt. 350.lbs- PT Bariatric walker left in room; mcmullen    AROM RLE (degrees)  RLE AROM: WFL  AROM LLE (degrees)  LLE AROM : WFL  AROM RUE (degrees)  RUE AROM : WFL  AROM LUE (degrees)  LUE AROM : WFL  Strength RLE  Strength RLE: WFL  Strength LLE  Strength LLE: WFL  Strength RUE  Strength RUE: WFL  Strength LUE  Strength LUE: WFL  Tone RLE  RLE Tone: Normotonic  Tone LLE  LLE Tone: Normotonic     Bed mobility  Rolling to Left: Stand by assistance  Supine to Sit: Stand by assistance  Comment: up to chair  Transfers  Sit to Stand: Contact guard assistance  Stand to sit: Contact guard assistance (poor eccentric control)  Bed to Chair: Contact guard assistance  Ambulation  Ambulation?: Yes  Ambulation 1  Surface: level tile  Device: Rolling Walker;Single point cane  Assistance: Contact guard assistance;Minimal assistance  Quality of Gait: Pt. unsteady with st. cane; increased lateral wt. shift, partly because of his size.   Pt. more steady with bariatric RW, however did not like using. Prefers st. cane. Distance: 50ft; 90 ft. Comments: UP to bariatric chair at bedside     Balance  Posture: Good  Sitting - Static: Good  Sitting - Dynamic: Good  Standing - Static: Good; - (with device)  Exercises  Comments: Pt. states he has learned LE ex. multiple times in past- verbally reviewed     Assessment   Body structures, Functions, Activity limitations: Decreased functional mobility ; Decreased balance;Decreased endurance;Decreased strength  Assessment: Pt. enjoys being up and moving and is happy to work with PT. Recommending homePT to continue to address pt.'s gait/ balance deficits, and for overall conditioning  Prognosis: Good  Decision Making: Medium Complexity  REQUIRES PT FOLLOW UP: Yes  Activity Tolerance  Activity Tolerance: Patient limited by endurance         Plan   Plan  Times per week: 1-2x/day; 5-6days/wk  Current Treatment Recommendations: Strengthening, ROM, Balance Training, Functional Mobility Training, Transfer Training, Stair training, Gait Training, Endurance Training, Home Exercise Program, Safety Education & Training  Safety Devices  Type of devices: All fall risk precautions in place, Gait belt, Patient at risk for falls, Call light within reach, Left in chair, Nurse notified      AM-PAC Score  AM-PAC Inpatient Mobility Raw Score : 17  AM-PAC Inpatient T-Scale Score : 42.13  Mobility Inpatient CMS 0-100% Score: 50.57  Mobility Inpatient CMS G-Code Modifier : CK          Goals  Short term goals  Time Frame for Short term goals: 12 visits:  Short term goal 1: Pt. to be indep with bed mob. Short term goal 2: Pt. to be indep with transfers  Short term goal 3: Pt. to amb. indep with approp. assistive device, 100ft.    Short term goal 4: Pt. to have good standing dynamic balance with UE support  Short term goal 5: Pt. to navigate 5 steps to prepare for d/c home  Patient Goals   Patient goals : d/c home       Therapy Time   Individual Concurrent Group Co-treatment Time In  1135         Time Out  1200         Minutes  25             10 min.  Chart review    Teofilo DE LUNA, PT

## 2019-02-11 NOTE — PLAN OF CARE
[address] [place] [address]    Second Visit Note  For more detailed information please refer to the progress note of the day      2/11/2019    6:47 PM    Name:   Amilcar Marquez  MRN:     [id number]     Acct:      [de-identified]   Room:   [facility]/1010-02   Day:  1  Admit Date:  2/10/2019  5:06 PM    PCP:   ROMEL Powell CNP  Code Status:  Full Code        Pt vitals were reviewed   New labs were reviewed   Patient was seen    Updated plan :     1.  Refuses anticoagulation-he understands increased risk of cva or other embolic event        Whitley Ashraf Blood, DO  2/11/2019  6:47 PM

## 2019-02-11 NOTE — CARE COORDINATION
Case Management Initial Discharge Plan  Payal Newman Rosalva,         Readmission Risk              Risk of Unplanned Readmission:        10             Met with:patient to discuss discharge plans. Information verified: address, contacts, phone number, , insurance Yes  PCP: ROMEL Romero CNP  Date of last visit: in November     Insurance Provider: medicare A and B only     Discharge Planning  Current Residence:  Private home   Living Arrangements:  Family Members       Home has 1  stories/2 stairs to climb  Support Systems:  Family Members       Current Services PTA:  None   Agency: none       Patient able to perform ADL's:Independent  DME in home:  Cane and walker   DME used to aid ambulation prior to admission: cane  DME used during admission:  Walker     Potential Assistance Needed:  Home Care, [place]/Passport    Pharmacy: [facility] Medications:  Yes  Does patient want to participate in local refill/ meds to beds program?  No    Patient agreeable to home care: No  [place] of choice provided:  n/a      Type of Home Care Services:  None  Patient expects to be discharged to:  private residence     Prior SNF/Rehab Placement and Facility: none   Agreeable to SNF/Rehab: No  [place] of choice provided: n/a   Evaluation: n/a    Expected Discharge date:  19  Follow Up Appointment: Best Day/ Time: Thursday AM    Transportation provider: per family   Transportation arrangements needed for discharge: No    Discharge Plan:   Met with patient to follow up on POC. Patient lives with mother and does drive. He has history of falls and admitted with a fib RVR. Patient on lovenox bid and may need a/c. He has medicare A&B only, no rx coverage. Orders show DR Bob Bahena. Spoke with patient and he cannot afford the cost , cannot guarantee that he will meet qualifications for financial assistance.  Did discuss coumadin and he refused \"rat poison\"    Perfect serve to Dr Rojas to discuss with patient on second rounds. Cardiology is consulted and await their notes.      Will follow up in am     Electronically signed by Greg Fothergill, RN on 2/11/19 at 4:43 PM

## 2019-02-11 NOTE — PROGRESS NOTES
Transitions of Care Pharmacy Service   Medication Review    The patient's list of current home medications has been reviewed. Source(s) of information: Patient, [facility], [facility]    Based on information provided by the above source(s), I have updated the patient's home med list as described below. Please review the ACTION REQUESTED BY PHYSICIAN section of this note for any discrepancies on current hospital orders. I changed or updated the following medications on the patient's home medication list:  Discontinued · Potassium ER 20 MEQ twice daily - patient not taking due to side effects     Added · Allopurinol 100 mg daily     Adjusted   · Furosemide 20 mg daily - increased to furosemide 40 mg daily  · Isosorbide mononitrate CD 30 mg daily - increased to isosorbide mononitrate ER 60 mg daily  · Metoprolol tartrate 25 mg BID - changed to Metoprolol tartrate 50 mg daily  · Metolazone 10 mg tablet half tablet daily - decreased to Metolazone 2.5 mg tablet once daily  · Digoxin po - switched to digoxin 125 mcg daily  · Colchicine 0.6 mg daily - switched to daily prn for flare ups since starting allopurinol 12/12/18     Other Notes · Patient had Pravastatin 20 mg daily filled 90 day supply 12/18/18, however patient not taking due to recent cholesterol levels being normal          PHYSICIAN ACTION REQUESTED  Discrepancies on current hospital orders that need to be addressed by a physician:    Medication Action Requested   Colchicine 0.6 mg     Ordered as daily. Patient uses allopurinol 100 mg daily instead and colchicine 0.6 mg daily prn for flare ups only. Please considered ordering allopurinol 100 mg daily. Furosemide 20 mg Patient takes furosemide 40 mg daily at home. Please consider dose adjustment     Isosorbide mononitrate ER 30 mg Patient uses Isosorbide mononitrate ER 60 mg daily at home.  Please consider ordering home dose         Please feel free to call me with any questions about this encounter. Thank you. This note will be reviewed and co-signed by the Transitions of Care Pharmacist.    Leandro Gomez PharmD student  Transitions of Care Pharmacy Service  Phone:  [phone]  Fax: [phone]      Electronically signed by Leandro Gomez on 2/11/2019 at 11:47 AM       Prior to Admission medications    Medication Sig Start Date End Date Taking?  Authorizing Provider   furosemide (LASIX) 40 MG tablet Take 40 mg by mouth daily   Yes Historical Provider, MD   isosorbide mononitrate (IMDUR) 60 MG extended release tablet Take 60 mg by mouth daily   Yes Historical Provider, MD   metolazone (ZAROXOLYN) 2.5 MG tablet Take 2.5 mg by mouth daily   Yes Historical Provider, MD   metoprolol tartrate (LOPRESSOR) 50 MG tablet Take 50 mg by mouth daily   Yes Historical Provider, MD   allopurinol (ZYLOPRIM) 100 MG tablet Take 100 mg by mouth daily   Yes Historical Provider, MD   meloxicam (MOBIC) 7.5 MG tablet Take 1 tablet by mouth daily 1/19/16  Yes Nicole Olivera MD   digoxin (LANOXIN) 125 MCG tablet Take 0.125 mg by mouth daily   Yes Historical Provider, MD   aspirin 81 MG tablet Take 1 tablet by mouth daily 10/4/15  Yes Carlos Beck MD   colchicine (COLCRYS) 0.6 MG tablet Take 1 tablet by mouth daily  Patient taking differently: Take 0.6 mg by mouth daily as needed (Gout flares)  10/4/15  Yes Carlos Beck MD

## 2019-02-11 NOTE — PROGRESS NOTES
At 20:00,Pt to room per stretcher from ER with family in attendance, pt oriented to room, orders reviewed with family and pt, pt oriented to room, vitals taken and assessment completed

## 2019-02-11 NOTE — PROGRESS NOTES
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 falls (2 falls in last 2 days)       Objective        Orientation  Overall Orientation Status: Within Functional Limits  Observation/Palpation  Posture: Good  Observation: Wt. 350.lbs- PT Bariatric walker left in room; mcmullen  Balance  Sitting Balance: Supervision  Standing Balance: Minimal assistance  Standing Balance  Sit to stand: Contact guard assistance  Stand to sit:  (cues to control sit (poor eccentric control). Educatd on hand techs with both sit to stand and stand to sit)  Functional Mobility  Functional - Mobility Device: Cane  Assist Level: Minimal assistance  Functional Mobility Comments: Pt. unsteady with st. cane; increased lateral wt. shift, partly because of his size. Pt. more steady with bariatric RW (CGA), however did not like using. Prefers st. cane. Very wide IVAN d/t body habitus   ADL  Feeding: Independent  Grooming: Stand by assistance  UE Bathing: Minimal assistance; Moderate assistance  LE Bathing: Maximum assistance  UE Dressing: Moderate assistance;Minimal assistance  LE Dressing: Maximum assistance  Toileting: Moderate assistance  Tone RUE  RUE Tone: Normotonic  Tone LUE  LUE Tone: Normotonic  Coordination  Movements Are Fluid And Coordinated: Yes     Bed mobility  Rolling to Left: Stand by assistance  Supine to Sit: Stand by assistance  Transfers  Sit to stand: Contact guard assistance  Stand to sit:  (cues to control sit (poor eccentric control).  Educatd on hand techs with both sit to stand and stand to sit)     Cognition  Overall Cognitive Status: Exceptions  Safety Judgement: Decreased awareness of need for safety;Decreased awareness of need for assistance  Problem Solving: Assistance required to generate solutions;Assistance required to implement solutions;Assistance required to correct errors made;Decreased awareness of errors  Insights: Decreased awareness of deficits  Perception  Overall Perceptual Status: WFL     Sensation  Overall Sensation Status: WFL        LUE AROM (degrees)  LUE AROM : WFL  RUE AROM (degrees)  RUE AROM : WFL  LUE Strength  Gross LUE Strength: WFL (B UE's 4+/5)  RUE Strength  Gross RUE Strength: WFL                  Assessment      Activity Tolerance  Activity Tolerance: Patient Tolerated treatment well  Activity Tolerance: pt very motivated to be OOB and to stand/walk d/t being uncomfortable in bed. Pt is agreeable then to sit up in chair  Safety Devices  Safety Devices in place: Yes  Type of devices: Call light within reach;Nurse notified;Gait belt;Patient at risk for falls; Left in chair         Plan   Plan  Times per week: 4-5x/week, 1-2x/day  Current Treatment Recommendations: Self-Care / ADL, Patient/Caregiver Education & Training, Balance Training, Functional Mobility Training, Endurance Training, Safety Education & Training    G-Code     OutComes Score                                                  AM-PAC Score             Goals  Short term goals  Time Frame for Short term goals: by discharge, pt will  Short term goal 1: demo SBA with ADL transfers with good safety  Short term goal 2: demo SBA with functional mob for ADL completion with good safety/pacing, approp DME/AD  Short term goal 3: demo SBA with toileting routine  Short term goal 4: demo SBA with UB ADLs and min A LB ADLs with DME/AE as needed  Short term goal 5: demo and verb good understanding of fall  prevention techs, EC/WS techs, and possible equip needs.    Patient Goals   Patient goals : to go home       Therapy Time   Individual Concurrent Group Co-treatment   Time In 200 (+10 min chart review)         Time Out 1150         Minutes 35              Pt would benefit from skilled home OT services in order to maximize occupational performance, safety, and independence in the home environment.      Artur Curry, OT

## 2019-02-11 NOTE — PROGRESS NOTES
Occupational Therapy   Occupational Therapy Initial Assessment  Date: 2019   Patient Name: Murali Gamez  MRN: [id number]     : 1955    Date of Service: 2019    Discharge Recommendations:  Home with Home health OT, Home with assist PRN     RN reports patient is medically stable for therapy treatment this date. Chart reviewed prior to treatment and patient is agreeable for therapy. All lines intact and patient positioned comfortably at end of treatment. All patient needs addressed prior to ending therapy session. Patient Diagnosis(es): The primary encounter diagnosis was Urinary tract infection without hematuria, site unspecified. Diagnoses of Hyponatremia, Hypokalemia, Atrial fibrillation with rapid ventricular response ([address] and Hypertension, unspecified type were also pertinent to this visit. has a past medical history of Atrial fibrillation ([address] Gout; Hypertension; and Leg wound, right.   has a past surgical history that includes Cardiac valve replacement and Cholecystectomy (10/2/2015). Restrictions  Restrictions/Precautions  Restrictions/Precautions: Fall Risk, General Precautions    Subjective   General  Chart Reviewed: Yes  Patient assessed for rehabilitation services?: Yes  Family / Caregiver Present: No  Pain Assessment  Patient Currently in Pain: Yes  Pain Assessment: 0-10  Pain Level: 3  Pain Type: Chronic pain  Pain Location: Back  Oxygen Therapy  SpO2: 96 %  O2 Device: None (Room air)  Social/Functional History  Social/Functional History  Lives With: Family (mother)  Type of Home: House  Home Layout: One level, Laundry in basement  Home Access: Stairs to enter with rails (2+5)  Entrance Stairs - Rails: Right  Bathroom Shower/Tub: Walk-in shower (in basement)  Bathroom Toilet: Handicap height  Home Equipment: Rolling walker, Cane (unsure if bariatric RW)  ADL Assistance: Independent  Homemaking Assistance: Independent (pt does cooking/laundry for mother. 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 falls (2 falls in last 2 days)       Objective        Orientation  Overall Orientation Status: Within Functional Limits  Observation/Palpation  Posture: Good  Observation: Wt. 350.lbs- PT Bariatric walker left in room; mcmullen  Balance  Sitting Balance: Supervision  Standing Balance: Minimal assistance  Standing Balance  Sit to stand: Contact guard assistance  Stand to sit:  (cues to control sit (poor eccentric control). Educatd on hand techs with both sit to stand and stand to sit)  Functional Mobility  Functional - Mobility Device: Cane  Assist Level: Minimal assistance  Functional Mobility Comments: Pt. unsteady with st. cane; increased lateral wt. shift, partly because of his size. Pt. more steady with bariatric RW (CGA), however did not like using. Prefers st. cane. Very wide IVAN d/t body habitus   ADL  Feeding: Independent  Grooming: Stand by assistance  UE Bathing: Minimal assistance; Moderate assistance  LE Bathing: Maximum assistance  UE Dressing: Moderate assistance;Minimal assistance  LE Dressing: Maximum assistance  Toileting: Moderate assistance  Tone RUE  RUE Tone: Normotonic  Tone LUE  LUE Tone: Normotonic  Coordination  Movements Are Fluid And Coordinated: Yes     Bed mobility  Rolling to Left: Stand by assistance  Supine to Sit: Stand by assistance  Transfers  Sit to stand: Contact guard assistance  Stand to sit:  (cues to control sit (poor eccentric control).  Educatd on hand techs with both sit to stand and stand to sit)     Cognition  Overall Cognitive Status: Exceptions  Safety Judgement: Decreased awareness of need for safety;Decreased awareness of need for assistance  Problem Solving: Assistance required to generate solutions;Assistance required to implement solutions;Assistance required to correct errors made;Decreased awareness of errors  Insights: Decreased awareness of deficits  Perception  Overall Perceptual Status: WFL     Sensation  Overall Sensation Status: WFL        LUE AROM (degrees)  LUE AROM : WFL  RUE AROM (degrees)  RUE AROM : WFL  LUE Strength  Gross LUE Strength: WFL (B UE's 4+/5)  RUE Strength  Gross RUE Strength: WFL                  Assessment   Performance deficits / Impairments: Decreased functional mobility ; Decreased ADL status; Decreased strength;Decreased safe awareness;Decreased endurance;Decreased balance  Prognosis: Good  Decision Making: High Complexity  Patient Education: OT POC, discharge recommendations, safety with mob and transfers, approp AD  REQUIRES OT FOLLOW UP: Yes  Activity Tolerance  Activity Tolerance: Patient Tolerated treatment well  Activity Tolerance: pt very motivated to be OOB and to stand/walk d/t being uncomfortable in bed. Pt is agreeable then to sit up in chair  Safety Devices  Safety Devices in place: Yes  Type of devices: Call light within reach;Nurse notified;Gait belt;Patient at risk for falls; Left in chair         Plan   Plan  Times per week: 4-5x/week, 1-2x/day  Current Treatment Recommendations: Self-Care / ADL, Patient/Caregiver Education & Training, Balance Training, Functional Mobility Training, Endurance Training, Safety Education & Training    G-Code     OutComes Score                                                  AM-PAC Score             Goals  Short term goals  Time Frame for Short term goals: by discharge, pt will  Short term goal 1: demo SBA with ADL transfers with good safety  Short term goal 2: demo SBA with functional mob for ADL completion with good safety/pacing, approp DME/AD  Short term goal 3: demo SBA with toileting routine  Short term goal 4: demo SBA with UB ADLs and min A LB ADLs with DME/AE as needed  Short term goal 5: demo
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
## 2019-02-11 NOTE — ED PROVIDER NOTES
Attending Supervisory Note/Shared Visit   I have personally performed a face to face diagnostic evaluation on this patient. I have reviewed the mid-levels findings and agree.         (Please note that portions of this note were completed with a voice recognition program.  Efforts were made to edit the dictations but occasionally words are mis-transcribed.)    Joann Palacios MD  Attending Emergency Physician      Joann Palacios MD  02/10/19 9014

## 2019-02-12 PROBLEM — M62.82 NON-TRAUMATIC RHABDOMYOLYSIS: Status: ACTIVE | Noted: 2019-02-10

## 2019-02-12 PROBLEM — M62.82 NON-TRAUMATIC RHABDOMYOLYSIS: Status: RESOLVED | Noted: 2019-02-10 | Resolved: 2019-02-12

## 2019-02-12 LAB
ANION GAP SERPL CALCULATED.3IONS-SCNC: 15 MMOL/L (ref 9–17)
BUN BLDV-MCNC: 21 MG/DL (ref 8–23)
BUN/CREAT BLD: 23 (ref 9–20)
CALCIUM SERPL-MCNC: 8.8 MG/DL (ref 8.6–10.4)
CHLORIDE BLD-SCNC: 81 MMOL/L (ref 98–107)
CO2: 27 MMOL/L (ref 20–31)
CORTISOL COLLECTION INFO: NORMAL
CORTISOL: 17.4 UG/DL (ref 2.7–18.4)
CREAT SERPL-MCNC: 0.91 MG/DL (ref 0.7–1.2)
CULTURE: ABNORMAL
CULTURE: ABNORMAL
DIRECT EXAM: ABNORMAL
GFR AFRICAN AMERICAN: >60 ML/MIN
GFR NON-AFRICAN AMERICAN: >60 ML/MIN
GFR SERPL CREATININE-BSD FRML MDRD: ABNORMAL ML/MIN/{1.73_M2}
GFR SERPL CREATININE-BSD FRML MDRD: ABNORMAL ML/MIN/{1.73_M2}
GLUCOSE BLD-MCNC: 107 MG/DL (ref 70–99)
HCT VFR BLD CALC: 32.3 % (ref 41–53)
HEMOGLOBIN: 11.3 G/DL (ref 13.5–17.5)
Lab: ABNORMAL
MAGNESIUM: 1.7 MG/DL (ref 1.6–2.6)
MCH RBC QN AUTO: 32.3 PG (ref 26–34)
MCHC RBC AUTO-ENTMCNC: 34.9 G/DL (ref 31–37)
MCV RBC AUTO: 92.5 FL (ref 80–100)
NRBC AUTOMATED: ABNORMAL PER 100 WBC
PDW BLD-RTO: 17.3 % (ref 11.5–14.5)
PLATELET # BLD: 249 K/UL (ref 130–400)
PMV BLD AUTO: 6.8 FL (ref 6–12)
POTASSIUM SERPL-SCNC: 3.4 MMOL/L (ref 3.7–5.3)
RBC # BLD: 3.49 M/UL (ref 4.5–5.9)
SODIUM BLD-SCNC: 122 MMOL/L (ref 135–144)
SODIUM BLD-SCNC: 122 MMOL/L (ref 135–144)
SODIUM BLD-SCNC: 123 MMOL/L (ref 135–144)
SODIUM BLD-SCNC: 123 MMOL/L (ref 135–144)
SPECIMEN DESCRIPTION: ABNORMAL
WBC # BLD: 13.3 K/UL (ref 3.5–11)

## 2019-02-12 PROCEDURE — 6370000000 HC RX 637 (ALT 250 FOR IP): Performed by: NURSE PRACTITIONER

## 2019-02-12 PROCEDURE — 85027 COMPLETE CBC AUTOMATED: CPT

## 2019-02-12 PROCEDURE — 97530 THERAPEUTIC ACTIVITIES: CPT

## 2019-02-12 PROCEDURE — 6370000000 HC RX 637 (ALT 250 FOR IP): Performed by: INTERNAL MEDICINE

## 2019-02-12 PROCEDURE — 99233 SBSQ HOSP IP/OBS HIGH 50: CPT | Performed by: INTERNAL MEDICINE

## 2019-02-12 PROCEDURE — 2060000000 HC ICU INTERMEDIATE R&B

## 2019-02-12 PROCEDURE — 2580000003 HC RX 258: Performed by: NURSE PRACTITIONER

## 2019-02-12 PROCEDURE — 82533 TOTAL CORTISOL: CPT

## 2019-02-12 PROCEDURE — 36415 COLL VENOUS BLD VENIPUNCTURE: CPT

## 2019-02-12 PROCEDURE — 83735 ASSAY OF MAGNESIUM: CPT

## 2019-02-12 PROCEDURE — 80048 BASIC METABOLIC PNL TOTAL CA: CPT

## 2019-02-12 PROCEDURE — 97535 SELF CARE MNGMENT TRAINING: CPT

## 2019-02-12 PROCEDURE — 97116 GAIT TRAINING THERAPY: CPT

## 2019-02-12 PROCEDURE — 6360000002 HC RX W HCPCS: Performed by: NURSE PRACTITIONER

## 2019-02-12 PROCEDURE — 84295 ASSAY OF SERUM SODIUM: CPT

## 2019-02-12 RX ORDER — ALLOPURINOL 100 MG/1
100 TABLET ORAL DAILY
Status: DISCONTINUED | OUTPATIENT
Start: 2019-02-12 | End: 2019-02-14 | Stop reason: HOSPADM

## 2019-02-12 RX ORDER — PRAVASTATIN SODIUM 20 MG
20 TABLET ORAL DAILY
COMMUNITY
End: 2019-02-18 | Stop reason: SDUPTHER

## 2019-02-12 RX ORDER — POTASSIUM CHLORIDE 750 MG/1
10 TABLET, EXTENDED RELEASE ORAL DAILY
Status: ON HOLD | COMMUNITY
End: 2019-02-14 | Stop reason: HOSPADM

## 2019-02-12 RX ORDER — DIGOXIN 125 MCG
0.12 TABLET ORAL DAILY
Status: DISCONTINUED | OUTPATIENT
Start: 2019-02-12 | End: 2019-02-14 | Stop reason: HOSPADM

## 2019-02-12 RX ORDER — COLCHICINE 0.6 MG/1
0.6 CAPSULE ORAL DAILY PRN
Status: DISCONTINUED | OUTPATIENT
Start: 2019-02-12 | End: 2019-02-14 | Stop reason: HOSPADM

## 2019-02-12 RX ORDER — PRAVASTATIN SODIUM 20 MG
20 TABLET ORAL DAILY
Status: DISCONTINUED | OUTPATIENT
Start: 2019-02-12 | End: 2019-02-14 | Stop reason: HOSPADM

## 2019-02-12 RX ADMIN — ALLOPURINOL 100 MG: 100 TABLET ORAL at 11:53

## 2019-02-12 RX ADMIN — PRAVASTATIN SODIUM 20 MG: 20 TABLET ORAL at 11:53

## 2019-02-12 RX ADMIN — COLCHICINE 0.6 MG: 0.6 CAPSULE ORAL at 11:48

## 2019-02-12 RX ADMIN — POTASSIUM CHLORIDE 40 MEQ: 20 TABLET, EXTENDED RELEASE ORAL at 11:53

## 2019-02-12 RX ADMIN — ISOSORBIDE MONONITRATE 30 MG: 30 TABLET ORAL at 11:48

## 2019-02-12 RX ADMIN — POTASSIUM CHLORIDE 40 MEQ: 20 TABLET, EXTENDED RELEASE ORAL at 17:56

## 2019-02-12 RX ADMIN — DIGOXIN 0.12 MG: 125 TABLET ORAL at 11:53

## 2019-02-12 RX ADMIN — APIXABAN 5 MG: 5 TABLET, FILM COATED ORAL at 11:48

## 2019-02-12 RX ADMIN — METOPROLOL TARTRATE 25 MG: 25 TABLET ORAL at 11:48

## 2019-02-12 RX ADMIN — ASPIRIN 81 MG: 81 TABLET, COATED ORAL at 11:49

## 2019-02-12 RX ADMIN — APIXABAN 5 MG: 5 TABLET, FILM COATED ORAL at 20:23

## 2019-02-12 RX ADMIN — METOPROLOL TARTRATE 25 MG: 25 TABLET ORAL at 20:23

## 2019-02-12 RX ADMIN — FUROSEMIDE 20 MG: 20 TABLET ORAL at 11:48

## 2019-02-12 RX ADMIN — CEFTRIAXONE SODIUM 1 G: 1 INJECTION, POWDER, FOR SOLUTION INTRAMUSCULAR; INTRAVENOUS at 22:29

## 2019-02-12 ASSESSMENT — PAIN DESCRIPTION - LOCATION: LOCATION: BACK

## 2019-02-12 ASSESSMENT — PAIN DESCRIPTION - PAIN TYPE: TYPE: CHRONIC PAIN

## 2019-02-12 ASSESSMENT — PAIN SCALES - GENERAL: PAINLEVEL_OUTOF10: 0

## 2019-02-12 NOTE — CARE COORDINATION
Discharge planning    Noted Dr Rojas second round note of patient refusing any anticoagulation. Also declines home care . Will follow. Dr Rojas up this morning and apparently patient  has changed his mind in regards to anticoagulation. He would like to pursue eliquis    Obtained the following   1. One month script to use free voucher on discharge  2. 1 year script for assistance program  3. Assistance program paper work signed per Dr Rojas and patient   4. Referral to [facility] anticoagulation clinic. Explained process to patient. Call to Coco at the a/c clinic and dropped off the 1 year rx, assistance paper work and face sheet to her to initiate. Free month rx and voucher brought to [facility] pharmacy to fill. Will need a/c appt made prior to discharge.  Patient verbalized understanding of the above
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
## 2019-02-12 NOTE — PROGRESS NOTES
Physical Therapy  Facility/Department: [facility] PROGRESSIVE CARE  Daily Treatment Note  NAME: Juan Narayan  : 1955  MRN: [id number]    Date of Service: 2019    Discharge Recommendations:  Home with Home health PT        Patient Diagnosis(es): The primary encounter diagnosis was Urinary tract infection without hematuria, site unspecified. Diagnoses of Hyponatremia, Hypokalemia, Atrial fibrillation with rapid ventricular response ([address] and Hypertension, unspecified type were also pertinent to this visit. has a past medical history of Atrial fibrillation ([address] Gout; Hypertension; and Leg wound, right.   has a past surgical history that includes Cardiac valve replacement and Cholecystectomy (10/2/2015). Restrictions  Restrictions/Precautions  Restrictions/Precautions: Fall Risk, General Precautions  Subjective   General  Chart Reviewed: Yes  Family / Caregiver Present: No  Subjective  Subjective: Pt agreeable to PT  General Comment  Comments: RnJacquelyn PT  Pain Screening  Patient Currently in Pain: Yes  Pain Assessment  Pain Assessment: 0-10  Pain Level: 0  Pain Type: Chronic pain  Pain Location: Back  Vital Signs  Patient Currently in Pain: Yes  Patient Observation  Observations: Pt in bedside chair upon arrival       Orientation     Cognition      Objective      Transfers  Sit to Stand: Contact guard assistance  Stand to sit: Contact guard assistance  Bed to Chair: Contact guard assistance  Ambulation  Ambulation?: Yes  Ambulation 1  Surface: level tile  Device: Rolling Walker  Assistance: Contact guard assistance;Minimal assistance  Quality of Gait: Pt slow but steady gait  Distance: 70ft   Comments: UP to bariatric chair at bedside     Balance  Posture: Good  Sitting - Static: Good  Sitting - Dynamic: Good  Standing - Static: Good;-                           Assessment   Body structures, Functions, Activity limitations: Decreased functional mobility ; Decreased balance;Decreased endurance;Decreased strength  Assessment: Pt demos low endurance  Prognosis: Good  Activity Tolerance  Activity Tolerance: Patient limited by endurance  Other Comments  Comments: Pt assisted with sanitary care at sink with SBA     G-Code     OutComes Score                                                  AM-PAC Score  AM-PAC Inpatient Mobility Raw Score : 16  AM-PAC Inpatient T-Scale Score : 40.78  Mobility Inpatient CMS 0-100% Score: 54.16  Mobility Inpatient CMS G-Code Modifier : CK          Goals  Short term goals  Time Frame for Short term goals: 12 visits:  Short term goal 1: Pt. to be indep with bed mob. Short term goal 2: Pt. to be indep with transfers  Short term goal 3: Pt. to amb. indep with approp. assistive device, 100ft. Short term goal 4: Pt. to have good standing dynamic balance with UE support  Short term goal 5: Pt. to navigate 5 steps to prepare for d/c home  Patient Goals   Patient goals : d/c home    Plan    Plan  Times per week: 1-2x/day; 5-6days/wk  Current Treatment Recommendations: Strengthening, ROM, Balance Training, Functional Mobility Training, Transfer Training, Stair training, Gait Training, Endurance Training, Home Exercise Program, Safety Education & Training  Safety Devices  Type of devices:  All fall risk precautions in place, Gait belt, Patient at risk for falls, Call light within reach, Left in chair, Nurse notified     Therapy Time   Individual Concurrent Group Co-treatment   Time In  1059         Time Out  ART [address] , Student Physical Therapist Assistant   Under the supervision of Blossom Martin PTA

## 2019-02-12 NOTE — PLAN OF CARE
Problem: Falls - Risk of:  Goal: Will remain free from falls  Will remain free from falls   Outcome: Ongoing  Siderails up x 2  Hourly rounding. Call light in reach. Instructed to call for assist before attempting out of bed. Remains free from falls and accidental injury at this time. Floor free from obstacles, and bed is locked and in lowest position. Adequate lighting provided. Bed alarm on. Fall sticker on wristband. Fall Sign posted in doorway    Goal: Absence of physical injury  Absence of physical injury   Outcome: Ongoing      Problem: Risk for Impaired Skin Integrity  Goal: Tissue integrity - skin and mucous membranes  Structural intactness and normal physiological function of skin and  mucous membranes.    Outcome: Ongoing

## 2019-02-12 NOTE — PLAN OF CARE
Problem: Risk for Impaired Skin Integrity  Goal: Tissue integrity - skin and mucous membranes  Structural intactness and normal physiological function of skin and  mucous membranes. Outcome: Ongoing  Skin: scab to LLE. Mucus membranes moist. Patient turned and repositioned as needed. Heels elevated as needed.  Dressings changed  and will Continue to monitor skin for breakdown

## 2019-02-12 NOTE — PROGRESS NOTES
Status: Exceptions  Arousal/Alertness: Appropriate responses to stimuli  Following Commands: Follows one step commands consistently  Attention Span: Appears intact  Memory: Appears intact  Safety Judgement: Decreased awareness of need for safety;Decreased awareness of need for assistance  Problem Solving: Assistance required to generate solutions;Assistance required to implement solutions;Assistance required to correct errors made;Decreased awareness of errors  Insights: Decreased awareness of deficits  Initiation: Requires cues for some  Sequencing: Requires cues for some     Perception  Overall Perceptual Status: WFL                                   Assessment   Performance deficits / Impairments: Decreased functional mobility ; Decreased ADL status; Decreased strength;Decreased safe awareness;Decreased endurance;Decreased balance  Prognosis: Good  Decision Making: High Complexity  REQUIRES OT FOLLOW UP: Yes  Activity Tolerance  Activity Tolerance: Patient Tolerated treatment well  Activity Tolerance: fair/fair+  Safety Devices  Safety Devices in place: Yes  Type of devices: Call light within reach;Nurse notified;Gait belt;Patient at risk for falls; Left in chair          Plan   Plan  Times per week: 4-5x/week, 1-2x/day  Current Treatment Recommendations: Self-Care / ADL, Patient/Caregiver Education & Training, Balance Training, Functional Mobility Training, Endurance Training, Safety Education & Training  G-Code     OutComes Score                                                  AM-PAC Score        AM-[facility] Inpatient Daily Activity Raw Score: 17  AM-PAC Inpatient ADL T-Scale Score : 37.26  ADL Inpatient CMS 0-100% Score: 50.11  ADL Inpatient CMS G-Code Modifier : CK    Goals  Short term goals  Time Frame for Short term goals: by discharge, pt will  Short term goal 1: demo SBA with ADL transfers with good safety  Short term goal 2: demo SBA with functional mob for ADL completion with good safety/pacing, approp DME/AD  Short term goal 3: demo SBA with toileting routine  Short term goal 4: demo SBA with UB ADLs and min A LB ADLs with DME/AE as needed  Short term goal 5: demo and verb good understanding of fall  prevention techs, EC/WS techs, and possible equip needs.    Patient Goals   Patient goals : to go home       Therapy Time   Individual Concurrent Group Co-treatment   Time In [place] [id number]         Time Out 1428         Minutes [address]

## 2019-02-13 LAB
ANION GAP SERPL CALCULATED.3IONS-SCNC: 12 MMOL/L (ref 9–17)
BUN BLDV-MCNC: 19 MG/DL (ref 8–23)
BUN/CREAT BLD: 20 (ref 9–20)
CALCIUM SERPL-MCNC: 8.8 MG/DL (ref 8.6–10.4)
CHLORIDE BLD-SCNC: 83 MMOL/L (ref 98–107)
CO2: 30 MMOL/L (ref 20–31)
CREAT SERPL-MCNC: 0.94 MG/DL (ref 0.7–1.2)
GFR AFRICAN AMERICAN: >60 ML/MIN
GFR NON-AFRICAN AMERICAN: >60 ML/MIN
GFR SERPL CREATININE-BSD FRML MDRD: ABNORMAL ML/MIN/{1.73_M2}
GFR SERPL CREATININE-BSD FRML MDRD: ABNORMAL ML/MIN/{1.73_M2}
GLUCOSE BLD-MCNC: 103 MG/DL (ref 70–99)
POTASSIUM SERPL-SCNC: 3.8 MMOL/L (ref 3.7–5.3)
SODIUM BLD-SCNC: 122 MMOL/L (ref 135–144)
SODIUM BLD-SCNC: 125 MMOL/L (ref 135–144)
SODIUM BLD-SCNC: 126 MMOL/L (ref 135–144)

## 2019-02-13 PROCEDURE — 97116 GAIT TRAINING THERAPY: CPT

## 2019-02-13 PROCEDURE — 84295 ASSAY OF SERUM SODIUM: CPT

## 2019-02-13 PROCEDURE — 36415 COLL VENOUS BLD VENIPUNCTURE: CPT

## 2019-02-13 PROCEDURE — 97530 THERAPEUTIC ACTIVITIES: CPT

## 2019-02-13 PROCEDURE — 6370000000 HC RX 637 (ALT 250 FOR IP): Performed by: NURSE PRACTITIONER

## 2019-02-13 PROCEDURE — 80048 BASIC METABOLIC PNL TOTAL CA: CPT

## 2019-02-13 PROCEDURE — 99233 SBSQ HOSP IP/OBS HIGH 50: CPT | Performed by: INTERNAL MEDICINE

## 2019-02-13 PROCEDURE — 2060000000 HC ICU INTERMEDIATE R&B

## 2019-02-13 PROCEDURE — 97110 THERAPEUTIC EXERCISES: CPT

## 2019-02-13 PROCEDURE — 6370000000 HC RX 637 (ALT 250 FOR IP): Performed by: INTERNAL MEDICINE

## 2019-02-13 RX ORDER — LISINOPRIL 5 MG/1
5 TABLET ORAL DAILY
Status: DISCONTINUED | OUTPATIENT
Start: 2019-02-13 | End: 2019-02-14 | Stop reason: HOSPADM

## 2019-02-13 RX ORDER — CEPHALEXIN 250 MG/1
250 CAPSULE ORAL EVERY 6 HOURS SCHEDULED
Status: DISCONTINUED | OUTPATIENT
Start: 2019-02-13 | End: 2019-02-14 | Stop reason: HOSPADM

## 2019-02-13 RX ADMIN — APIXABAN 5 MG: 5 TABLET, FILM COATED ORAL at 08:53

## 2019-02-13 RX ADMIN — FUROSEMIDE 20 MG: 20 TABLET ORAL at 08:53

## 2019-02-13 RX ADMIN — ALLOPURINOL 100 MG: 100 TABLET ORAL at 08:53

## 2019-02-13 RX ADMIN — CEPHALEXIN 250 MG: 250 CAPSULE ORAL at 23:40

## 2019-02-13 RX ADMIN — POTASSIUM CHLORIDE 40 MEQ: 20 TABLET, EXTENDED RELEASE ORAL at 08:53

## 2019-02-13 RX ADMIN — METOPROLOL TARTRATE 25 MG: 25 TABLET ORAL at 08:53

## 2019-02-13 RX ADMIN — DIGOXIN 0.12 MG: 125 TABLET ORAL at 08:53

## 2019-02-13 RX ADMIN — ISOSORBIDE MONONITRATE 30 MG: 30 TABLET ORAL at 08:53

## 2019-02-13 RX ADMIN — CEPHALEXIN 250 MG: 250 CAPSULE ORAL at 12:29

## 2019-02-13 RX ADMIN — POTASSIUM CHLORIDE 40 MEQ: 20 TABLET, EXTENDED RELEASE ORAL at 17:23

## 2019-02-13 RX ADMIN — CEPHALEXIN 250 MG: 250 CAPSULE ORAL at 17:23

## 2019-02-13 RX ADMIN — METOPROLOL TARTRATE 25 MG: 25 TABLET ORAL at 20:35

## 2019-02-13 RX ADMIN — ASPIRIN 81 MG: 81 TABLET, COATED ORAL at 08:53

## 2019-02-13 RX ADMIN — PRAVASTATIN SODIUM 20 MG: 20 TABLET ORAL at 08:53

## 2019-02-13 RX ADMIN — APIXABAN 5 MG: 5 TABLET, FILM COATED ORAL at 20:35

## 2019-02-13 RX ADMIN — LISINOPRIL 5 MG: 5 TABLET ORAL at 12:29

## 2019-02-13 ASSESSMENT — PAIN SCALES - GENERAL: PAINLEVEL_OUTOF10: 0

## 2019-02-13 NOTE — PROGRESS NOTES
DATE: 2019    NAME: Karissa Jenkins  MRN: [id number]   : 1955    Patient not seen this date for Physical Therapy due to:  [] Blood transfusion in progress  [] Cancel by RN  [] Hemodialysis  []  Refusal by Patient   [] Spine Precautions   [] Strict Bedrest  [] Surgery  [] Testing      [x] Other (am treatment cancelled. . Pt. Showering)         [] PT being discontinued at this time. Patient independent. No further needs. [] PT being discontinued at this time as the patient has been transferred to hospice care. No further needs.     BHAVYA URBAN, PTA
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
## 2019-02-13 NOTE — PLAN OF CARE
[facility]    Second Visit Note  For more detailed information please refer to the progress note of the day      2/13/2019    4:50 PM    Name:   Giovanni Wiley  MRN:     [id number]     Acct:      [de-identified]   Room:   101/1010-02   Day:  3  Admit Date:  2/10/2019  5:06 PM    PCP:   ROMEL Guy CNP  Code Status:  Full Code        Pt vitals were reviewed   New labs were reviewed   Patient was seen    Updated plan :     1. Na has gone up a bit more  2.  Anticipate dc home 24h        Newt Code Blood, DO  2/13/2019  4:50 PM

## 2019-02-13 NOTE — PROGRESS NOTES
Physical Therapy  Facility/Department: [facility] PROGRESSIVE CARE  Daily Treatment Note  NAME: Hever Mendoza  : 1955  MRN: [id number]    Date of Service: 2019    Discharge Recommendations:  Home with Home health PT        Patient Diagnosis(es): The primary encounter diagnosis was Urinary tract infection without hematuria, site unspecified. Diagnoses of Hyponatremia, Hypokalemia, Atrial fibrillation with rapid ventricular response ([address] and Hypertension, unspecified type were also pertinent to this visit. has a past medical history of Atrial fibrillation ([address] Gout; Hypertension; and Leg wound, right.   has a past surgical history that includes Cardiac valve replacement and Cholecystectomy (10/2/2015).     Restrictions  Restrictions/Precautions  Restrictions/Precautions: Fall Risk, General Precautions, Up as Tolerated  Required Braces or Orthoses?: No  Subjective   General  Chart Reviewed: Yes  Family / Caregiver Present: No  Subjective  Subjective: Pt agreeable to PT  General Comment  Comments: [facility] PT  Pain Assessment  Pain Assessment: 0-10  Pain Level: 0  Patient Observation  Observations: Pt in bedside chair upon arrival       Orientation     Cognition      Objective      Transfers  Sit to Stand: Contact guard assistance  Stand to sit: Contact guard assistance  Bed to Chair: Contact guard assistance  Stand Pivot Transfers: Contact guard assistance  Comment: Assisted to pt to bathroom for toilet transfer with CGA  Ambulation  Ambulation?: Yes  Ambulation 1  Surface: level tile  Device: Single point cane  Assistance: Contact guard assistance  Quality of Gait: Short step length, slow naun  Distance: 80 ft   Comments: UP to bariatric chair at bedside     Balance  Posture: Good  Sitting - Static: Good  Sitting - Dynamic: Good  Standing - Static: Good;-  Exercises  Comments: Pt performed ther ex of BLE in seated position x 20 reps; Pt given written HEP                         Assessment Body structures, Functions, Activity limitations: Decreased functional mobility ; Decreased balance;Decreased endurance;Decreased strength  Assessment: Pt would benefit from home PT to increase endurance  Prognosis: Good  Activity Tolerance  Activity Tolerance: Patient limited by endurance  Other Comments  Comments: Pt reports feeling fatigued     G-Code     OutComes Score                                                  AM-PAC Score  AM-PAC Inpatient Mobility Raw Score : 16  AM-PAC Inpatient T-Scale Score : 40.78  Mobility Inpatient CMS 0-100% Score: 54.16  Mobility Inpatient CMS G-Code Modifier : CK          Goals  Short term goals  Time Frame for Short term goals: 12 visits:  Short term goal 1: Pt. to be indep with bed mob. Short term goal 2: Pt. to be indep with transfers  Short term goal 3: Pt. to amb. indep with approp. assistive device, 100ft. Short term goal 4: Pt. to have good standing dynamic balance with UE support  Short term goal 5: Pt. to navigate 5 steps to prepare for d/c home  Patient Goals   Patient goals : d/c home    Plan    Plan  Times per week: 1-2x/day; 5-6days/wk  Current Treatment Recommendations: Strengthening, ROM, Balance Training, Functional Mobility Training, Transfer Training, Stair training, Gait Training, Endurance Training, Home Exercise Program, Safety Education & Training  Safety Devices  Type of devices:  All fall risk precautions in place, Gait belt, Patient at risk for falls, Call light within reach, Left in chair, Nurse notified     Therapy Time   Individual Concurrent Group Co-treatment   Time In  [place] [address]         Time Out  1501         Minutes  [facility],  [address] , Student Physical Therapist Assistant   Under the supervision of Mallorie Cabral, [place]

## 2019-02-14 VITALS
SYSTOLIC BLOOD PRESSURE: 107 MMHG | RESPIRATION RATE: 20 BRPM | HEART RATE: 77 BPM | TEMPERATURE: 98.6 F | HEIGHT: 72 IN | WEIGHT: 315 LBS | DIASTOLIC BLOOD PRESSURE: 46 MMHG | BODY MASS INDEX: 42.66 KG/M2 | OXYGEN SATURATION: 95 %

## 2019-02-14 LAB
ANION GAP SERPL CALCULATED.3IONS-SCNC: 13 MMOL/L (ref 9–17)
BUN BLDV-MCNC: 19 MG/DL (ref 8–23)
BUN/CREAT BLD: 22 (ref 9–20)
CALCIUM SERPL-MCNC: 8.9 MG/DL (ref 8.6–10.4)
CHLORIDE BLD-SCNC: 84 MMOL/L (ref 98–107)
CO2: 29 MMOL/L (ref 20–31)
CREAT SERPL-MCNC: 0.88 MG/DL (ref 0.7–1.2)
GFR AFRICAN AMERICAN: >60 ML/MIN
GFR NON-AFRICAN AMERICAN: >60 ML/MIN
GFR SERPL CREATININE-BSD FRML MDRD: ABNORMAL ML/MIN/{1.73_M2}
GFR SERPL CREATININE-BSD FRML MDRD: ABNORMAL ML/MIN/{1.73_M2}
GLUCOSE BLD-MCNC: 97 MG/DL (ref 70–99)
POTASSIUM SERPL-SCNC: 3.9 MMOL/L (ref 3.7–5.3)
SODIUM BLD-SCNC: 125 MMOL/L (ref 135–144)
SODIUM BLD-SCNC: 126 MMOL/L (ref 135–144)
SODIUM BLD-SCNC: 127 MMOL/L (ref 135–144)

## 2019-02-14 PROCEDURE — 97535 SELF CARE MNGMENT TRAINING: CPT

## 2019-02-14 PROCEDURE — 6370000000 HC RX 637 (ALT 250 FOR IP): Performed by: NURSE PRACTITIONER

## 2019-02-14 PROCEDURE — 6370000000 HC RX 637 (ALT 250 FOR IP): Performed by: INTERNAL MEDICINE

## 2019-02-14 PROCEDURE — 80048 BASIC METABOLIC PNL TOTAL CA: CPT

## 2019-02-14 PROCEDURE — 84295 ASSAY OF SERUM SODIUM: CPT

## 2019-02-14 PROCEDURE — 99239 HOSP IP/OBS DSCHRG MGMT >30: CPT | Performed by: INTERNAL MEDICINE

## 2019-02-14 PROCEDURE — 36415 COLL VENOUS BLD VENIPUNCTURE: CPT

## 2019-02-14 RX ORDER — FUROSEMIDE 20 MG/1
20 TABLET ORAL DAILY
Qty: 60 TABLET | Refills: 3 | Status: SHIPPED | OUTPATIENT
Start: 2019-02-15 | End: 2020-02-18

## 2019-02-14 RX ORDER — ISOSORBIDE MONONITRATE 30 MG/1
30 TABLET, EXTENDED RELEASE ORAL DAILY
Qty: 30 TABLET | Refills: 3 | Status: SHIPPED | OUTPATIENT
Start: 2019-02-15 | End: 2019-02-18 | Stop reason: DRUGHIGH

## 2019-02-14 RX ORDER — POTASSIUM CHLORIDE 20 MEQ/1
40 TABLET, EXTENDED RELEASE ORAL DAILY
Qty: 60 TABLET | Refills: 3 | Status: SHIPPED | OUTPATIENT
Start: 2019-02-14 | End: 2019-04-15 | Stop reason: SDUPTHER

## 2019-02-14 RX ORDER — CEPHALEXIN 250 MG/1
250 CAPSULE ORAL 4 TIMES DAILY
Qty: 12 CAPSULE | Refills: 0 | Status: SHIPPED | OUTPATIENT
Start: 2019-02-14 | End: 2019-02-17

## 2019-02-14 RX ORDER — LISINOPRIL 5 MG/1
5 TABLET ORAL DAILY
Qty: 30 TABLET | Refills: 3 | Status: SHIPPED | OUTPATIENT
Start: 2019-02-15 | End: 2019-02-18 | Stop reason: ALTCHOICE

## 2019-02-14 RX ADMIN — DIGOXIN 0.12 MG: 125 TABLET ORAL at 08:48

## 2019-02-14 RX ADMIN — FUROSEMIDE 20 MG: 20 TABLET ORAL at 08:48

## 2019-02-14 RX ADMIN — ALLOPURINOL 100 MG: 100 TABLET ORAL at 08:47

## 2019-02-14 RX ADMIN — METOPROLOL TARTRATE 25 MG: 25 TABLET ORAL at 08:48

## 2019-02-14 RX ADMIN — ISOSORBIDE MONONITRATE 30 MG: 30 TABLET ORAL at 08:48

## 2019-02-14 RX ADMIN — CEPHALEXIN 250 MG: 250 CAPSULE ORAL at 12:20

## 2019-02-14 RX ADMIN — APIXABAN 5 MG: 5 TABLET, FILM COATED ORAL at 08:47

## 2019-02-14 RX ADMIN — POTASSIUM CHLORIDE 40 MEQ: 20 TABLET, EXTENDED RELEASE ORAL at 08:47

## 2019-02-14 RX ADMIN — ASPIRIN 81 MG: 81 TABLET, COATED ORAL at 08:47

## 2019-02-14 RX ADMIN — CEPHALEXIN 250 MG: 250 CAPSULE ORAL at 05:39

## 2019-02-14 RX ADMIN — LISINOPRIL 5 MG: 5 TABLET ORAL at 08:47

## 2019-02-14 ASSESSMENT — PAIN SCALES - GENERAL: PAINLEVEL_OUTOF10: 3

## 2019-02-14 ASSESSMENT — PAIN DESCRIPTION - FREQUENCY: FREQUENCY: CONTINUOUS

## 2019-02-14 ASSESSMENT — PAIN DESCRIPTION - ORIENTATION: ORIENTATION: LOWER

## 2019-02-14 ASSESSMENT — PAIN DESCRIPTION - DESCRIPTORS: DESCRIPTORS: ACHING

## 2019-02-14 ASSESSMENT — PAIN DESCRIPTION - PAIN TYPE: TYPE: CHRONIC PAIN

## 2019-02-14 ASSESSMENT — PAIN DESCRIPTION - LOCATION: LOCATION: BACK

## 2019-02-14 NOTE — DISCHARGE SUMMARY
[address]    Discharge Summary     Patient ID: Clarissa Bonner  :  1955   MRN: [id number]     ACCOUNT:  [de-identified]   Patient's PCP: ROMEL Hamm CNP  Admit Date: 2/10/2019   Discharge Date: 2019     Length of Stay: 4  Code Status:  Full Code  Admitting Physician: Terri Pizano DO  Discharge Physician: Claribel Rojas DO     Active Discharge Diagnoses:     Hospital Problem Lists:  Principal Problem:    Atrial fibrillation with rapid ventricular response ([address]  Active Problems:    Chronic atrial fibrillation ([address]    Morbid obesity with BMI of 45.0-49.9, adult ([address]    Chronic osteomyelitis involving lower leg ([address]    H/O mitral valve replacement with tissue graft    Liver disease, alcoholic (HCC)    Hyponatremia    Hypokalemia    Leukocytosis    Hypoxemia    Hypertension    Acute cystitis with hematuria    Generalized weakness    Hypomagnesemia    Urinary tract infection without hematuria    Acute on chronic congestive heart failure (HCC)    Chronic systolic (congestive) heart failure ([address]  Resolved Problems:    CAD (coronary artery disease)    Non-traumatic rhabdomyolysis      Admission Condition:  poor     Discharged Condition: fair    Hospital Stay:     Hospital Course:  Clarissa Bonner is a 70 y.o. male who was admitted for the management of   Atrial fibrillation with rapid ventricular response ([address] , presented to ER with Extremity Weakness and Fall    Admitted with severe hyponatremia and weakness with rapid afib. Hr controlled with meds and placed on full dose anticoagulation, switched to eliquis for discharge. Echo revealed ef 30%, placed on ace-I and beta blocker. Was fluid restricted for low na, and it came up to 127. He runs chronically low na. He had been drinking 2+Gallons h2o per day. Taken off zaroxolyn and lasix reduced. Also had low k which was replaced and supplementation increased.     Also originally admitted with urinary symptoms which improved with iv rocephin      Significant therapeutic interventions: See above      Significant Diagnostic Studies:   Labs / Micro:  CBC:   Lab Results   Component Value Date    WBC 13.3 02/12/2019    RBC 3.49 02/12/2019    HGB 11.3 02/12/2019    HCT 32.3 02/12/2019    MCV 92.5 02/12/2019    MCH 32.3 02/12/2019    MCHC 34.9 02/12/2019    RDW 17.3 02/12/2019     02/12/2019     CMP:    Lab Results   Component Value Date    GLUCOSE 97 02/14/2019     02/14/2019    K 3.9 02/14/2019    CL 84 02/14/2019    CO2 29 02/14/2019    BUN 19 02/14/2019    CREATININE 0.88 02/14/2019    ANIONGAP 13 02/14/2019    ALKPHOS 64 02/11/2019    ALT 15 02/11/2019    AST 57 02/11/2019    BILITOT 1.99 02/11/2019    LABALBU 3.1 02/11/2019    ALBUMIN NOT REPORTED 02/11/2019    LABGLOM >60 02/14/2019    GFRAA >60 02/14/2019    GFR      02/14/2019    GFR NOT REPORTED 02/14/2019    PROT 6.5 02/11/2019    CALCIUM 8.9 02/14/2019       ,   blood culture: negative ,     Radiology:    Xr Chest Portable    Result Date: 2/10/2019  EXAMINATION: SINGLE XRAY VIEW OF THE CHEST 2/10/2019 5:43 pm COMPARISON: January 18, 2016 HISTORY: ORDERING SYSTEM PROVIDED HISTORY: SOB TECHNOLOGIST PROVIDED HISTORY: SOB Ordering Physician Provided Reason for Exam: sob Acuity: Acute Type of Exam: Initial FINDINGS: Moderate cardiomegaly unchanged. Mild edema. Sternotomy wires noted. Bony thorax intact. Mild CHF     Ct Chest Pulmonary Embolism W Contrast    Result Date: 2/10/2019  EXAMINATION: CTA OF THE CHEST 2/10/2019 8:10 pm TECHNIQUE: CTA of the chest was performed after the administration of intravenous contrast.  Multiplanar reformatted images are provided for review. MIP images are provided for review. Dose modulation, iterative reconstruction, and/or weight based adjustment of the mA/kV was utilized to reduce the radiation dose to as low as reasonably achievable. COMPARISON: None.  HISTORY: ORDERING SYSTEM PROVIDED HISTORY: Shortness of breath, tachycardia, rule out PE FINDINGS: Pulmonary Arteries: Pulmonary arteries are adequately opacified for evaluation. No evidence of intraluminal filling defect to suggest pulmonary embolism. Main pulmonary artery is normal in caliber. Mediastinum: No evidence of mediastinal lymphadenopathy. The heart and pericardium demonstrate no acute abnormality. There is no acute abnormality of the thoracic aorta. Prosthetic heart valve. Moderate cardiomegaly. Coronary artery disease. Sternotomy wires. Lungs/pleura: There is a mild increase in interstitial markings in congestion. Upper Abdomen: Limited images of the upper abdomen are unremarkable. Soft Tissues/Bones: No acute bone or soft tissue abnormality. Mild CHF. Consultations:    Consults:     Final Specialist Recommendations/Findings:   IP CONSULT TO HOSPITALIST  IP CONSULT TO HEART FAILURE NURSE/COORDINATOR      The patient was seen and examined on day of discharge and this discharge summary is in conjunction with any daily progress note from day of discharge. Discharge plan:     Disposition: Home    Physician Follow Up:     ROMEL Russell CNP  [address]   [facility] [address]  [address], [address] [place] [address]  [phone]    Schedule an appointment as soon as possible for a visit  afib, chf; make appointment at [address] office       Requiring Further Evaluation/Follow Up POST HOSPITALIZATION/Incidental Findings: afib, chf; likely needs cath in future    Diet: cardiac diet    Activity: As tolerated    Instructions to Patient: follow fluid restriction 1500mL  take medications as prescribed  return if you worsen  F/u pcp and cardio    Discharge Medications:      Medication List      START taking these medications    apixaban 5 MG Tabs tablet  Commonly known asComer Asaf  Take 1 tablet by mouth 2 times daily     cephALEXin 250 MG capsule  Commonly known as:  KEFLEX  Take 1 capsule by mouth 4 times daily for 3 days     lisinopril 5 MG tablet  Commonly known as:  PRINIVIL;ZESTRIL  Take 1 tablet by mouth daily  Start taking on:  2/15/2019        CHANGE how you take these medications    colchicine 0.6 MG tablet  Commonly known as:  COLCRYS  What changed:  Another medication with the same name was removed. Continue taking this medication, and follow the directions you see here. furosemide 20 MG tablet  Commonly known as:  LASIX  Take 1 tablet by mouth daily  Start taking on:  2/15/2019  What changed:  Another medication with the same name was removed. Continue taking this medication, and follow the directions you see here. isosorbide mononitrate 30 MG extended release tablet  Commonly known as:  IMDUR  Take 1 tablet by mouth daily  Start taking on:  2/15/2019  What changed:  Another medication with the same name was removed. Continue taking this medication, and follow the directions you see here. metoprolol tartrate 25 MG tablet  Commonly known as:  LOPRESSOR  Take 1 tablet by mouth 2 times daily  What changed:  Another medication with the same name was removed. Continue taking this medication, and follow the directions you see here. potassium chloride 20 MEQ extended release tablet  Commonly known as:  KLOR-CON M  Take 2 tablets by mouth daily  What changed:  · how much to take  · Another medication with the same name was removed. Continue taking this medication, and follow the directions you see here.         CONTINUE taking these medications    allopurinol 100 MG tablet  Commonly known as:  ZYLOPRIM     aspirin 81 MG tablet  Take 1 tablet by mouth daily     digoxin 125 MCG tablet  Commonly known as:  LANOXIN     pravastatin 20 MG tablet  Commonly known as:  PRAVACHOL        STOP taking these medications    meloxicam 7.5 MG tablet  Commonly known as: MOBIC     metolazone 10 MG tablet  Commonly known as:  ZAROXOLYN     metolazone 2.5 MG tablet  Commonly known as:  ZAROXOLYN           Where to Get Your Medications      These medications were sent to [address], [facility] [phone] - F [phone]  [address] [place] [address], [facility] [address]    Phone:  [phone]   · apixaban 5 MG Tabs tablet  · cephALEXin 250 MG capsule  · furosemide 20 MG tablet  · isosorbide mononitrate 30 MG extended release tablet  · lisinopril 5 MG tablet  · metoprolol tartrate 25 MG tablet  · potassium chloride 20 MEQ extended release tablet         Time Spent on discharge is  40 mins in patient examination, evaluation, counseling as well as medication reconciliation, prescriptions for required medications, discharge plan and follow up. Electronically signed by   Bouchra Rojas DO  2/14/2019  10:24 AM      Thank you Dr. Estela Marin, APRN - CNP for the opportunity to be involved in this patient's care.

## 2019-02-14 NOTE — PROGRESS NOTES
32.3   MCHC  34.9   RDW  17.3*   PLT  249   MPV  6.8     Chemistry:  Recent Labs      02/12/19   0629   02/13/19   0626   02/14/19   0002  02/14/19   0548  02/14/19   0651   NA  123*   < >  125*   < >  125*  126*  127*   K  3.4*   --   3.8   --    --   3.9   --    CL  81*   --   83*   --    --   84*   --    CO2  27   --   30   --    --   29   --    GLUCOSE  107*   --   103*   --    --   97   --    BUN  21   --   19   --    --   19   --    CREATININE  0.91   --   0.94   --    --   0.88   --    MG  1.7   --    --    --    --    --    --    ANIONGAP  15   --   12   --    --   13   --    LABGLOM  >60   --   >60   --    --   >60   --    GFRAA  >60   --   >60   --    --   >60   --    CALCIUM  8.8   --   8.8   --    --   8.9   --     < > = values in this interval not displayed. No results for input(s): PROT, LABALBU, LABA1C, P6HKREO, H9QIAJC, FT4, TSH, AST, ALT, LDH, GGT, ALKPHOS, LABGGT, BILITOT, BILIDIR, AMMONIA, AMYLASE, LIPASE, LACTATE, CHOL, HDL, LDLCHOLESTEROL, CHOLHDLRATIO, TRIG, VLDL, ELE45XH, PHENYTOIN, PHENYF, URICACID, POCGLU in the last 72 hours. Lab Results   Component Value Date/Time    SPECIAL NOT REPORTED 02/10/2019 06:24 PM     Lab Results   Component Value Date/Time    CULTURE (A) 02/10/2019 06:24 PM     MULTIPLE SPECIES OF GRAM NEGATIVE BACTERIA, NO PREDOMINANT ORGANISM ISOLATED.     CULTURE NORMAL SKIN DUSTIN (A) 02/10/2019 06:24 PM       Lab Results   Component Value Date    FIO2 21.0 02/10/2019       Radiology:    Nothing new      Physical Examination:        General appearance:  alert, cooperative and no distress  Mental Status:  oriented to person, place and time and normal affect  Lungs:  clear to auscultation bilaterally, normal effort  Heart:  regular rate and irreg irreg rhythm, no murmur  Abdomen:  soft, nontender, nondistended, normal bowel sounds, no masses, hepatomegaly, splenomegaly; obese  Extremities:  no redness, tenderness in the calves; mild ble edema  Skin:  Le chronic

## 2019-02-14 NOTE — PROGRESS NOTES
Supervision  Toilet Transfers  Toilet - Technique: Ambulating  Equipment Used: Standard toilet  Toilet Transfer: Supervision     Transfers  Stand Step Transfers: Supervision  Stand Pivot Transfers: Supervision  Sit to stand: Supervision  Stand to sit: Supervision                       Cognition  Overall Cognitive Status: Exceptions  Arousal/Alertness: Appropriate responses to stimuli  Following Commands: Follows one step commands consistently  Attention Span: Appears intact  Memory: Appears intact  Safety Judgement: Decreased awareness of need for safety;Decreased awareness of need for assistance  Problem Solving: Assistance required to generate solutions;Assistance required to implement solutions;Assistance required to correct errors made;Decreased awareness of errors  Insights: Decreased awareness of deficits  Initiation: Requires cues for some  Sequencing: Requires cues for some     Perception  Overall Perceptual Status: WFL                                   Assessment   Performance deficits / Impairments: Decreased functional mobility ; Decreased ADL status; Decreased strength;Decreased safe awareness;Decreased endurance;Decreased balance  Prognosis: Good  REQUIRES OT FOLLOW UP: Yes  Activity Tolerance  Activity Tolerance: Patient Tolerated treatment well  Activity Tolerance: fair/fair+  Safety Devices  Safety Devices in place: Yes  Type of devices: Call light within reach;Nurse notified;Gait belt;Patient at risk for falls; Left in chair          Plan   Plan  Times per week: 4-5x/week, 1-2x/day  Times per day: Daily  Current Treatment Recommendations: Self-Care / ADL, Patient/Caregiver Education & Training, Balance Training, Functional Mobility Training, Endurance Training, Safety Education & Training  G-Code     OutComes Score                                                  AM-PAC Score        AM-[facility] Inpatient Daily Activity Raw Score: 18  AM-PAC Inpatient ADL T-Scale Score : 38.66  ADL Inpatient CMS 0-100%

## 2019-02-14 NOTE — CARE COORDINATION
Patient: Zackary Clarke            Patient : 1955  MRN: [id number]             Reason for Admission: UTI, hyponatremia, A fib with RVR, HTN   DRG Cardiac Arrhythmia 309  RARS: Readmission Risk Score: 22%, CMRS 1                  Patient/family seen: Yes  Informed patient/family of BPCI-A Medical Bundle Program with potential outreach by either Care Transitions Team or naviHealth Team based on hospital admission and location. BPCI-A Notification Letter given: Yes  Current discharge plan: pt declining home care. Arrangements have been made to help patient get Eliquis free for 1st month (pt does not have prescription coverage), A & B only.  He has now applied for assistance for the Eliquis for the next year and is set up with [facility] [address] completed with case management: Yes, informed LACIE Cardoza RN, CTC

## 2019-02-15 ENCOUNTER — CARE COORDINATION (OUTPATIENT)
Dept: CASE MANAGEMENT | Age: 64
End: 2019-02-15

## 2019-02-16 ENCOUNTER — CARE COORDINATION (OUTPATIENT)
Dept: CASE MANAGEMENT | Age: 64
End: 2019-02-16

## 2019-02-16 LAB
CULTURE: NORMAL
CULTURE: NORMAL
Lab: NORMAL
Lab: NORMAL
SPECIMEN DESCRIPTION: NORMAL
SPECIMEN DESCRIPTION: NORMAL

## 2019-02-18 ENCOUNTER — HOSPITAL ENCOUNTER (OUTPATIENT)
Dept: PHARMACY | Age: 64
Setting detail: THERAPIES SERIES
Discharge: HOME | End: 2019-02-18
Payer: MEDICARE

## 2019-02-18 DIAGNOSIS — I48.91 ATRIAL FIBRILLATION WITH RAPID VENTRICULAR RESPONSE (HCC): ICD-10-CM

## 2019-02-18 PROBLEM — I51.7 MILD CONCENTRIC LEFT VENTRICULAR HYPERTROPHY (LVH): Status: ACTIVE | Noted: 2019-02-18

## 2019-02-18 PROBLEM — I42.0 DILATED CARDIOMYOPATHY (HCC): Status: ACTIVE | Noted: 2019-02-18

## 2019-02-18 PROCEDURE — 99211 OFF/OP EST MAY X REQ PHY/QHP: CPT

## 2019-02-18 PROCEDURE — 99212 OFFICE O/P EST SF 10 MIN: CPT

## 2019-02-20 ENCOUNTER — CARE COORDINATION (OUTPATIENT)
Dept: CASE MANAGEMENT | Age: 64
End: 2019-02-20

## 2019-02-21 ENCOUNTER — CARE COORDINATION (OUTPATIENT)
Dept: CASE MANAGEMENT | Age: 64
End: 2019-02-21

## 2019-02-28 ENCOUNTER — OFFICE VISIT (OUTPATIENT)
Dept: FAMILY MEDICINE CLINIC | Age: 64
End: 2019-02-28
Payer: MEDICARE

## 2019-02-28 ENCOUNTER — HOSPITAL ENCOUNTER (OUTPATIENT)
Age: 64
Discharge: HOME | End: 2019-02-28
Payer: MEDICARE

## 2019-02-28 VITALS
HEART RATE: 71 BPM | SYSTOLIC BLOOD PRESSURE: 132 MMHG | WEIGHT: 315 LBS | TEMPERATURE: 97.5 F | HEIGHT: 70 IN | OXYGEN SATURATION: 96 % | BODY MASS INDEX: 45.1 KG/M2 | DIASTOLIC BLOOD PRESSURE: 82 MMHG

## 2019-02-28 DIAGNOSIS — Z71.3 DIETARY COUNSELING: ICD-10-CM

## 2019-02-28 DIAGNOSIS — R35.0 URINARY FREQUENCY: ICD-10-CM

## 2019-02-28 DIAGNOSIS — E66.01 MORBID OBESITY WITH BMI OF 50.0-59.9, ADULT (HCC): ICD-10-CM

## 2019-02-28 DIAGNOSIS — L89.309 PRESSURE INJURY OF SKIN OF BUTTOCK, UNSPECIFIED INJURY STAGE, UNSPECIFIED LATERALITY: ICD-10-CM

## 2019-02-28 DIAGNOSIS — I10 ESSENTIAL HYPERTENSION: ICD-10-CM

## 2019-02-28 DIAGNOSIS — I50.9 CHRONIC CONGESTIVE HEART FAILURE, UNSPECIFIED HEART FAILURE TYPE (HCC): ICD-10-CM

## 2019-02-28 DIAGNOSIS — I48.19 PERSISTENT ATRIAL FIBRILLATION (HCC): ICD-10-CM

## 2019-02-28 DIAGNOSIS — Z91.81 AT HIGH RISK FOR FALLS: ICD-10-CM

## 2019-02-28 DIAGNOSIS — I50.9 CHRONIC CONGESTIVE HEART FAILURE, UNSPECIFIED HEART FAILURE TYPE (HCC): Primary | ICD-10-CM

## 2019-02-28 LAB
ALBUMIN SERPL-MCNC: 3.8 G/DL (ref 3.5–5.2)
ALBUMIN/GLOBULIN RATIO: 1 (ref 1–2.5)
ALP BLD-CCNC: 74 U/L (ref 40–129)
ALT SERPL-CCNC: 11 U/L (ref 5–41)
ANION GAP SERPL CALCULATED.3IONS-SCNC: 8 MMOL/L (ref 9–17)
AST SERPL-CCNC: 24 U/L
BILIRUB SERPL-MCNC: 1.02 MG/DL (ref 0.3–1.2)
BILIRUBIN, POC: NORMAL
BLOOD URINE, POC: NORMAL
BNP INTERPRETATION: ABNORMAL
BUN BLDV-MCNC: 14 MG/DL (ref 8–23)
BUN/CREAT BLD: ABNORMAL (ref 9–20)
CALCIUM SERPL-MCNC: 9.2 MG/DL (ref 8.6–10.4)
CHLORIDE BLD-SCNC: 102 MMOL/L (ref 98–107)
CLARITY, POC: NORMAL
CO2: 25 MMOL/L (ref 20–31)
COLOR, POC: YELLOW
CREAT SERPL-MCNC: 0.91 MG/DL (ref 0.7–1.2)
GFR AFRICAN AMERICAN: >60 ML/MIN
GFR NON-AFRICAN AMERICAN: >60 ML/MIN
GFR SERPL CREATININE-BSD FRML MDRD: ABNORMAL ML/MIN/{1.73_M2}
GFR SERPL CREATININE-BSD FRML MDRD: ABNORMAL ML/MIN/{1.73_M2}
GLUCOSE BLD-MCNC: 88 MG/DL (ref 70–99)
GLUCOSE URINE, POC: NORMAL
HCT VFR BLD CALC: 36.2 % (ref 40.7–50.3)
HEMOGLOBIN: 11.8 G/DL (ref 13–17)
KETONES, POC: NORMAL
LEUKOCYTE EST, POC: NORMAL
MCH RBC QN AUTO: 31.8 PG (ref 25.2–33.5)
MCHC RBC AUTO-ENTMCNC: 32.6 G/DL (ref 28.4–34.8)
MCV RBC AUTO: 97.6 FL (ref 82.6–102.9)
NITRITE, POC: NORMAL
NRBC AUTOMATED: 0 PER 100 WBC
PDW BLD-RTO: 16.9 % (ref 11.8–14.4)
PH, POC: 5.5
PLATELET # BLD: 305 K/UL (ref 138–453)
PMV BLD AUTO: 9.3 FL (ref 8.1–13.5)
POTASSIUM SERPL-SCNC: 4.8 MMOL/L (ref 3.7–5.3)
PRO-BNP: 3837 PG/ML
PROTEIN, POC: NORMAL
RBC # BLD: 3.71 M/UL (ref 4.21–5.77)
SODIUM BLD-SCNC: 135 MMOL/L (ref 135–144)
SPECIFIC GRAVITY, POC: <=1.005
TOTAL PROTEIN: 7.6 G/DL (ref 6.4–8.3)
UROBILINOGEN, POC: 0.2
WBC # BLD: 8.4 K/UL (ref 3.5–11.3)

## 2019-02-28 PROCEDURE — 3017F COLORECTAL CA SCREEN DOC REV: CPT | Performed by: NURSE PRACTITIONER

## 2019-02-28 PROCEDURE — G0447 BEHAVIOR COUNSEL OBESITY 15M: HCPCS | Performed by: NURSE PRACTITIONER

## 2019-02-28 PROCEDURE — G8427 DOCREV CUR MEDS BY ELIG CLIN: HCPCS | Performed by: NURSE PRACTITIONER

## 2019-02-28 PROCEDURE — 85027 COMPLETE CBC AUTOMATED: CPT

## 2019-02-28 PROCEDURE — 1036F TOBACCO NON-USER: CPT | Performed by: NURSE PRACTITIONER

## 2019-02-28 PROCEDURE — 99204 OFFICE O/P NEW MOD 45 MIN: CPT | Performed by: NURSE PRACTITIONER

## 2019-02-28 PROCEDURE — 1123F ACP DISCUSS/DSCN MKR DOCD: CPT | Performed by: NURSE PRACTITIONER

## 2019-02-28 PROCEDURE — 80053 COMPREHEN METABOLIC PANEL: CPT

## 2019-02-28 PROCEDURE — G8484 FLU IMMUNIZE NO ADMIN: HCPCS | Performed by: NURSE PRACTITIONER

## 2019-02-28 PROCEDURE — G8417 CALC BMI ABV UP PARAM F/U: HCPCS | Performed by: NURSE PRACTITIONER

## 2019-02-28 PROCEDURE — 1111F DSCHRG MED/CURRENT MED MERGE: CPT | Performed by: NURSE PRACTITIONER

## 2019-02-28 PROCEDURE — 36415 COLL VENOUS BLD VENIPUNCTURE: CPT

## 2019-02-28 PROCEDURE — G8598 ASA/ANTIPLAT THER USED: HCPCS | Performed by: NURSE PRACTITIONER

## 2019-02-28 PROCEDURE — 1101F PT FALLS ASSESS-DOCD LE1/YR: CPT | Performed by: NURSE PRACTITIONER

## 2019-02-28 PROCEDURE — 81002 URINALYSIS NONAUTO W/O SCOPE: CPT | Performed by: NURSE PRACTITIONER

## 2019-02-28 PROCEDURE — 4040F PNEUMOC VAC/ADMIN/RCVD: CPT | Performed by: NURSE PRACTITIONER

## 2019-02-28 PROCEDURE — 83880 ASSAY OF NATRIURETIC PEPTIDE: CPT

## 2019-02-28 ASSESSMENT — PATIENT HEALTH QUESTIONNAIRE - PHQ9
SUM OF ALL RESPONSES TO PHQ QUESTIONS 1-9: 0
SUM OF ALL RESPONSES TO PHQ9 QUESTIONS 1 & 2: 0
1. LITTLE INTEREST OR PLEASURE IN DOING THINGS: 0
SUM OF ALL RESPONSES TO PHQ QUESTIONS 1-9: 0
2. FEELING DOWN, DEPRESSED OR HOPELESS: 0

## 2019-03-01 ENCOUNTER — TELEPHONE (OUTPATIENT)
Dept: PHARMACY | Age: 64
End: 2019-03-01

## 2019-03-01 DIAGNOSIS — I48.91 ATRIAL FIBRILLATION WITH RAPID VENTRICULAR RESPONSE (HCC): ICD-10-CM

## 2019-03-06 ENCOUNTER — TELEPHONE (OUTPATIENT)
Dept: PHARMACY | Age: 64
End: 2019-03-06

## 2019-03-06 DIAGNOSIS — I48.91 ATRIAL FIBRILLATION WITH RAPID VENTRICULAR RESPONSE (HCC): ICD-10-CM

## 2019-03-08 ENCOUNTER — CARE COORDINATION (OUTPATIENT)
Dept: CASE MANAGEMENT | Age: 64
End: 2019-03-08

## 2019-03-23 ENCOUNTER — CARE COORDINATION (OUTPATIENT)
Dept: CASE MANAGEMENT | Age: 64
End: 2019-03-23

## 2019-04-12 ENCOUNTER — HOSPITAL ENCOUNTER (OUTPATIENT)
Dept: CARDIAC CATH/INVASIVE PROCEDURES | Age: 64
Discharge: HOME | End: 2019-04-12
Payer: MEDICARE

## 2019-04-12 ENCOUNTER — ANESTHESIA EVENT (OUTPATIENT)
Dept: CARDIAC CATH/INVASIVE PROCEDURES | Age: 64
End: 2019-04-12

## 2019-04-12 ENCOUNTER — ANESTHESIA (OUTPATIENT)
Dept: CARDIAC CATH/INVASIVE PROCEDURES | Age: 64
End: 2019-04-12

## 2019-04-12 VITALS
OXYGEN SATURATION: 94 % | SYSTOLIC BLOOD PRESSURE: 111 MMHG | HEART RATE: 50 BPM | BODY MASS INDEX: 45.1 KG/M2 | WEIGHT: 315 LBS | TEMPERATURE: 97 F | HEIGHT: 70 IN | DIASTOLIC BLOOD PRESSURE: 54 MMHG | RESPIRATION RATE: 15 BRPM

## 2019-04-12 VITALS
OXYGEN SATURATION: 93 % | SYSTOLIC BLOOD PRESSURE: 90 MMHG | RESPIRATION RATE: 18 BRPM | DIASTOLIC BLOOD PRESSURE: 63 MMHG

## 2019-04-12 LAB
ANION GAP SERPL CALCULATED.3IONS-SCNC: 10 MMOL/L (ref 9–17)
CHLORIDE BLD-SCNC: 96 MMOL/L (ref 98–107)
CO2: 30 MMOL/L (ref 20–31)
POTASSIUM SERPL-SCNC: 4.6 MMOL/L (ref 3.7–5.3)
SODIUM BLD-SCNC: 136 MMOL/L (ref 135–144)

## 2019-04-12 PROCEDURE — 2580000003 HC RX 258: Performed by: NURSE ANESTHETIST, CERTIFIED REGISTERED

## 2019-04-12 PROCEDURE — 80051 ELECTROLYTE PANEL: CPT

## 2019-04-12 PROCEDURE — 92960 CARDIOVERSION ELECTRIC EXT: CPT | Performed by: INTERNAL MEDICINE

## 2019-04-12 PROCEDURE — 6360000002 HC RX W HCPCS: Performed by: NURSE ANESTHETIST, CERTIFIED REGISTERED

## 2019-04-12 PROCEDURE — 7100000010 HC PHASE II RECOVERY - FIRST 15 MIN

## 2019-04-12 PROCEDURE — 2580000003 HC RX 258: Performed by: INTERNAL MEDICINE

## 2019-04-12 PROCEDURE — 7100000011 HC PHASE II RECOVERY - ADDTL 15 MIN

## 2019-04-12 PROCEDURE — 3700000001 HC ADD 15 MINUTES (ANESTHESIA)

## 2019-04-12 PROCEDURE — 93005 ELECTROCARDIOGRAM TRACING: CPT

## 2019-04-12 PROCEDURE — 3700000000 HC ANESTHESIA ATTENDED CARE

## 2019-04-12 RX ORDER — SODIUM CHLORIDE 9 MG/ML
INJECTION, SOLUTION INTRAVENOUS CONTINUOUS
Status: DISCONTINUED | OUTPATIENT
Start: 2019-04-12 | End: 2019-04-13 | Stop reason: HOSPADM

## 2019-04-12 RX ORDER — ONDANSETRON 2 MG/ML
4 INJECTION INTRAMUSCULAR; INTRAVENOUS
Status: ACTIVE | OUTPATIENT
Start: 2019-04-12 | End: 2019-04-12

## 2019-04-12 RX ORDER — PROPOFOL 10 MG/ML
INJECTION, EMULSION INTRAVENOUS PRN
Status: DISCONTINUED | OUTPATIENT
Start: 2019-04-12 | End: 2019-04-12 | Stop reason: SDUPTHER

## 2019-04-12 RX ORDER — SODIUM CHLORIDE 9 MG/ML
INJECTION, SOLUTION INTRAVENOUS CONTINUOUS PRN
Status: DISCONTINUED | OUTPATIENT
Start: 2019-04-12 | End: 2019-04-12 | Stop reason: SDUPTHER

## 2019-04-12 RX ADMIN — PROPOFOL 70 MG: 10 INJECTION, EMULSION INTRAVENOUS at 09:06

## 2019-04-12 RX ADMIN — SODIUM CHLORIDE: 9 INJECTION, SOLUTION INTRAVENOUS at 07:48

## 2019-04-12 RX ADMIN — SODIUM CHLORIDE: 9 INJECTION, SOLUTION INTRAVENOUS at 09:06

## 2019-04-12 ASSESSMENT — PAIN - FUNCTIONAL ASSESSMENT: PAIN_FUNCTIONAL_ASSESSMENT: 0-10

## 2019-04-12 ASSESSMENT — PAIN SCALES - GENERAL
PAINLEVEL_OUTOF10: 5

## 2019-04-12 ASSESSMENT — PAIN DESCRIPTION - FREQUENCY
FREQUENCY: CONTINUOUS

## 2019-04-12 ASSESSMENT — PAIN DESCRIPTION - DESCRIPTORS
DESCRIPTORS: DULL

## 2019-04-12 ASSESSMENT — PAIN DESCRIPTION - PAIN TYPE
TYPE: CHRONIC PAIN

## 2019-04-12 ASSESSMENT — PAIN DESCRIPTION - LOCATION
LOCATION: BACK

## 2019-04-12 NOTE — H&P
19  H&P Update     Name:   Karen Elena :  1955   MRN:   [id number] Gender:   male   PCP:  ROMEL Huang CNP Age: 70 y.o. Hospital: [facility] Room Number: Room/bed info not found      The H&P for this patent has been performed with in the last thirty days and is located in [place]. The H&P was reviewed and there are no changes in the patients condition. The planned procedure is cardioversion.       Electronically Signed by: Shamika Fox MD, Abdiaziz Benjamin

## 2019-04-12 NOTE — ANESTHESIA PRE PROCEDURE
Department of Anesthesiology  Preprocedure Note       Name:  Amilcar Marquez   Age:  70 y.o.  :  1955                                          MRN:  [id number]         Date:  2019      Surgeon: * No surgeons listed *    Procedure: CARDIOVERSION    Medications prior to admission:   Prior to Admission medications    Medication Sig Start Date End Date Taking?  Authorizing Provider   allopurinol (ZYLOPRIM) 100 MG tablet Take 1 tablet by mouth daily 19  Yes Malorie Jay MD   colchicine (COLCRYS) 0.6 MG tablet Take 1 tablet by mouth daily as needed (gout flare) 19  Yes Malorie Jay MD   digoxin [facility]) 125 MCG tablet Take 1 tablet by mouth daily 19  Yes Malorie Jay MD   pravastatin (PRAVACHOL) 20 MG tablet Take 1 tablet by mouth daily 19  Yes Malorie Jay MD   losartan (COZAAR) 50 MG tablet Take 1 tablet by mouth daily 19  Yes Malorie Jay MD   sotalol (BETAPACE) 80 MG tablet Take 1 tablet by mouth 2 times daily 19  Yes Malorie Jay MD   apixaban (ELIQUIS) 5 MG TABS tablet Take 1 tablet by mouth 2 times daily 19  Yes Merryl Caves P Blood, DO   furosemide (LASIX) 20 MG tablet Take 1 tablet by mouth daily 2/15/19  Yes Merryl Caves P Blood, DO   potassium chloride (KLOR-CON M) 20 MEQ extended release tablet Take 2 tablets by mouth daily 19  Yes Merryl Caves P Blood, DO   aspirin 81 MG tablet Take 1 tablet by mouth daily 10/4/15  Yes Fidelia Tolentino MD       Current medications:    Current Outpatient Medications   Medication Sig Dispense Refill    allopurinol (ZYLOPRIM) 100 MG tablet Take 1 tablet by mouth daily 30 tablet 11    colchicine (COLCRYS) 0.6 MG tablet Take 1 tablet by mouth daily as needed (gout flare) 30 tablet 11    digoxin (LANOXIN) 125 MCG tablet Take 1 tablet by mouth daily 30 tablet 11    pravastatin (PRAVACHOL) 20 MG tablet Take 1 tablet by mouth daily 30 tablet 11    losartan (COZAAR) 50 MG tablet Take 1 tablet by mouth daily 30 tablet 3    sotalol (BETAPACE) 80 MG tablet Take 1 tablet by mouth 2 times daily 60 tablet 5    apixaban (ELIQUIS) 5 MG TABS tablet Take 1 tablet by mouth 2 times daily 60 tablet 3    furosemide (LASIX) 20 MG tablet Take 1 tablet by mouth daily 60 tablet 3    potassium chloride (KLOR-CON M) 20 MEQ extended release tablet Take 2 tablets by mouth daily 60 tablet 3    aspirin 81 MG tablet Take 1 tablet by mouth daily 30 tablet 0     Current Facility-Administered Medications   Medication Dose Route Frequency Provider Last Rate Last Dose    0.9 % sodium chloride infusion   Intravenous Continuous Amira Gary MD        ondansetron [facility] injection 4 mg  4 mg Intravenous Once PRN Viv Nunn MD           Allergies:     Allergies   Allergen Reactions    Vicodin [Hydrocodone-Acetaminophen] Nausea Only       Problem List:    Patient Active Problem List   Diagnosis Code    Pancreatitis due to biliary obstruction K85.90, K83.1    Atrial fibrillation ([facility]) I48.91    Morbid obesity with BMI of 45.0-49.9, adult ([facility]) E66.01, Z68.42    HTN (hypertension) I10    Hypokalemia E87.6    Chronic osteomyelitis involving lower leg ([address] M86.669    H/O mitral valve replacement with tissue graft Z95.4    Acute pancreatitis K85.90    Liver disease, alcoholic ([facility]) Y34.8    S/P cholecystectomy Z90.49    Hemiparesis, left ([facility]) G81.94    Chronic atrial fibrillation ([facility]) I48.2    Osteomyelitis of right lower extremity ([address] M86.9    Hyponatremia E87.1    Hypokalemia E87.6    Leukocytosis D72.829    Hypoxemia R09.02    Hypertension I10    Acute cystitis with hematuria N30.01    Generalized weakness R53.1    Atrial fibrillation with rapid ventricular response ([facility]) I48.91    Hypomagnesemia E83.42    Urinary tract infection without hematuria N39.0    Acute on chronic congestive heart failure ([facility]) I50.9    Chronic systolic congestive heart failure, NYHA class 3 ([facility]) I50.22    Mild concentric left ventricular hypertrophy 02/28/2019    BILITOT 1.02 02/28/2019    ALKPHOS 74 02/28/2019    AST 24 02/28/2019    ALT 11 02/28/2019       POC Tests: No results for input(s): POCGLU, POCNA, POCK, POCCL, POCBUN, POCHEMO, POCHCT in the last 72 hours. Coags:   Lab Results   Component Value Date    PROTIME 11.9 01/17/2016    INR 1.1 01/17/2016    APTT 30.5 09/28/2015       HCG (If Applicable): No results found for: PREGTESTUR, PREGSERUM, HCG, HCGQUANT     ABGs: No results found for: PHART, PO2ART, QLD4USA, QEY3MCL, BEART, S8RXTBCG     Type & Screen (If Applicable):  No results found for: LABABO, LABRH    Anesthesia Evaluation    Airway: Mallampati: II  TM distance: >3 FB   Neck ROM: full  Mouth opening: > = 3 FB Dental:          Pulmonary:                              Cardiovascular:    (+) hypertension:, CHF:,     (-)  angina        Rate: normal                    Neuro/Psych:               GI/Hepatic/Renal:             Endo/Other:                     Abdominal:           Vascular:                                        Anesthesia Plan      general     ASA 4             Anesthetic plan and risks discussed with patient.                       Mitul العراقي MD   4/12/2019

## 2019-04-12 NOTE — PROCEDURES
19  Elective Cardioversion Note     Name:  Mabel Jackson PCP: ROMEL Foster CNP   MRN:  [id number]     : 1955 Hospital: [facility]     Procedure Note   The H&P for this patent has been performed with in the last thirty days and is located in the paper chart. The H&P was reviewed and there are no changes in the patients condition. The planned procedure is DC cardioversion. Cardiologist: Kendra Monroe MD, [facility]   Preprocedure Diagnosis: Atrial Fibrillation   Postprocedure Diagnosis: Successful conversion to Normal Sinus Rhythm   ASA Classification: 3   Mallampati Classification: Class III   Anesthesia: Per anesthesia   Post Procedure Evaluation: No anesthesia related complications, returned to anticipated LOC, cardiopulmonary and hydration status. Pain and nausea adequately controlled. Complications: None   Pad/Paddles Location: Pads- anterior/posterior   Number of Attempts: 1   [employer]: 200 Joules     Nursing patient history form reviewed. No changes noted. The risk, benefits, and alternatives were discussed with the patient. Written informed consent was obtained and a final time out was performed. The rhythm was confirmed, then with continuous electrocardiographic, non-invasive hemodynamic and oximetric monitoring, concious sedation was initiated. Once the patient was appropriately sedated, DC cardioversion was performed as noted above. An electrocardiogram was ordered and the patient was observed. Once the patient was fully awake and vital signes were stable, they were discharged home with family/friends. Electronically Signed By: Zen Calvert MD, [facility]    ____________________________________________  [address] [facility]  [address]  Tel:  [phone]      Fax:  [phone]  www. [facility]

## 2019-04-12 NOTE — DISCHARGE SUMMARY
19  Discharge Summary     Name:   Maira Fenton :  1955   MRN:   [id number] Gender:   male   PCP:  ROMEL Xiao CNP Age: 70 y.o. PCP Fax:  [phone]     Hospital: [facility]       Admit date: 2019 Discharge date: 2019        Discharge Diagnose(s):   1.  Atrial fibrillation    Problem List:   Patient Active Problem List   Diagnosis    Pancreatitis due to biliary obstruction    Atrial fibrillation (HCC)    Morbid obesity with BMI of 45.0-49.9, adult ([address]    HTN (hypertension)    Hypokalemia    Chronic osteomyelitis involving lower leg ([address]    H/O mitral valve replacement with tissue graft    Acute pancreatitis    Liver disease, alcoholic ([address]    S/P cholecystectomy    Hemiparesis, left ([address]    Chronic atrial fibrillation ([address]    Osteomyelitis of right lower extremity ([address]    Hyponatremia    Hypokalemia    Leukocytosis    Hypoxemia    Hypertension    Acute cystitis with hematuria    Generalized weakness    Atrial fibrillation with rapid ventricular response (HCC)    Hypomagnesemia    Urinary tract infection without hematuria    Acute on chronic congestive heart failure (HCC)    Chronic systolic congestive heart failure, NYHA class 3 (HCC)    Mild concentric left ventricular hypertrophy (LVH)    Dilated cardiomyopathy ([address]     Discharged Condition:   good  Indication for Admission:   [address] [place] [address] Course:    Unremarkable    Disposition:     home    Discharge Medications:    Jared Fairchild   Home Medication Instructions GNO:[id number]    Printed on:19 1654   Medication Information                      allopurinol (ZYLOPRIM) 100 MG tablet  Take 1 tablet by mouth daily             apixaban (ELIQUIS) 5 MG TABS tablet  Take 1 tablet by mouth 2 times daily             aspirin 81 MG tablet  Take 1 tablet by mouth daily             colchicine (COLCRYS) 0.6 MG tablet  Take 1 tablet by mouth daily as needed

## 2019-04-12 NOTE — ANESTHESIA POSTPROCEDURE EVALUATION
Department of Anesthesiology  Postprocedure Note    Patient: Maycol Clemons  MRN: [id number]  YOB: 1955  Date of evaluation: 4/12/2019  Time:  10:11 AM     Procedure Summary     Date:  04/12/19 [facility] / [facility]    Anesthesia Start:  0841 Anesthesia Stop:  4097    Procedure:  CARDIOVERSION Diagnosis:  A-fib ([address]    Scheduled Providers:   Responsible Provider:  Geraldo Johansen MD    Anesthesia Type:  general ASA Status:  4          Anesthesia Type: general    Emma Phase I: Emma Score: 10    Emma Phase II:      Last vitals: Reviewed and per EMR flowsheets.        Anesthesia Post Evaluation    Complications: no

## 2019-04-13 LAB
EKG ATRIAL RATE: 45 BPM
EKG ATRIAL RATE: 85 BPM
EKG P AXIS: 64 DEGREES
EKG P-R INTERVAL: 268 MS
EKG Q-T INTERVAL: 442 MS
EKG Q-T INTERVAL: 512 MS
EKG QRS DURATION: 106 MS
EKG QRS DURATION: 108 MS
EKG QTC CALCULATION (BAZETT): 442 MS
EKG QTC CALCULATION (BAZETT): 442 MS
EKG R AXIS: 59 DEGREES
EKG R AXIS: 69 DEGREES
EKG T AXIS: 40 DEGREES
EKG T AXIS: 48 DEGREES
EKG VENTRICULAR RATE: 45 BPM
EKG VENTRICULAR RATE: 60 BPM

## 2019-04-24 ENCOUNTER — CARE COORDINATION (OUTPATIENT)
Dept: CASE MANAGEMENT | Age: 64
End: 2019-04-24

## 2019-05-08 ENCOUNTER — CARE COORDINATION (OUTPATIENT)
Dept: CASE MANAGEMENT | Age: 64
End: 2019-05-08

## 2019-05-08 NOTE — CARE COORDINATION
[facility] Transitions Bundled Payments for Care Improvement (BPCI) Follow Up Call  Qualifying Diagnosis of Urinary Tract Infection:  5/8/2019  Patient Name:  Liz Marcial   YOB: 1955  Discharge Date:  2/14/19  RARS:  Readmission Risk Score: 11    PCP:  ROMEL Delacruz CNP    Assessment:  \"Doing very well, thank you\".  Appetite: \"good\"   Sleep: \"great\"   Medication needs/Questions: no   Ability to [employer], B[place]:   Follow Up Appointment Scheduled: yes   Do you feel like you have everything you need to stay well at home? \"I sure do\"   Teaching:  Call physician's office with any needs, concerns.      Follow Up Concerns:  Future Appointments   Date Time Provider Leydi Martinez   5/9/2019  2:00 PM ROMEL Cerda CNP [facility] [id number] [email or web address] [facility]   5/28/2019  2:00 PM ROMEL Delacruz CNP [facility]   8/19/2019  1:00 PM [facility]MT ST[facility] MED MGMT [address] Transitions will continue to follow per [facility] Program.  Renita Florentino, RN, CTC

## 2019-05-09 PROBLEM — Z79.01 CURRENT USE OF LONG TERM ANTICOAGULATION: Status: ACTIVE | Noted: 2019-05-09

## 2019-05-09 PROBLEM — E78.5 DYSLIPIDEMIA: Status: ACTIVE | Noted: 2019-05-09

## 2019-05-21 ENCOUNTER — OFFICE VISIT (OUTPATIENT)
Dept: FAMILY MEDICINE CLINIC | Age: 64
End: 2019-05-21
Payer: MEDICARE

## 2019-05-21 ENCOUNTER — HOSPITAL ENCOUNTER (OUTPATIENT)
Age: 64
Setting detail: SPECIMEN
Discharge: HOME | End: 2019-05-21
Payer: MEDICARE

## 2019-05-21 VITALS
SYSTOLIC BLOOD PRESSURE: 134 MMHG | WEIGHT: 315 LBS | DIASTOLIC BLOOD PRESSURE: 82 MMHG | OXYGEN SATURATION: 98 % | HEART RATE: 72 BPM | BODY MASS INDEX: 49.23 KG/M2

## 2019-05-21 DIAGNOSIS — R63.5 ABNORMAL WEIGHT GAIN: Primary | ICD-10-CM

## 2019-05-21 DIAGNOSIS — S81.801D OPEN WOUND OF RIGHT LOWER LEG, SUBSEQUENT ENCOUNTER: ICD-10-CM

## 2019-05-21 DIAGNOSIS — R63.5 ABNORMAL WEIGHT GAIN: ICD-10-CM

## 2019-05-21 LAB
-: NORMAL
HCT VFR BLD CALC: 44.8 % (ref 40.7–50.3)
HEMOGLOBIN: 14.5 G/DL (ref 13–17)
MCH RBC QN AUTO: 32.2 PG (ref 25.2–33.5)
MCHC RBC AUTO-ENTMCNC: 32.4 G/DL (ref 28.4–34.8)
MCV RBC AUTO: 99.3 FL (ref 82.6–102.9)
NRBC AUTOMATED: 0 PER 100 WBC
PDW BLD-RTO: 15 % (ref 11.8–14.4)
PLATELET # BLD: 290 K/UL (ref 138–453)
PMV BLD AUTO: 10.4 FL (ref 8.1–13.5)
RBC # BLD: 4.51 M/UL (ref 4.21–5.77)
REASON FOR REJECTION: NORMAL
WBC # BLD: 9.2 K/UL (ref 3.5–11.3)
ZZ NTE CLEAN UP: ORDERED TEST: NORMAL
ZZ NTE WITH NAME CLEAN UP: SPECIMEN SOURCE: NORMAL

## 2019-05-21 PROCEDURE — 99214 OFFICE O/P EST MOD 30 MIN: CPT | Performed by: NURSE PRACTITIONER

## 2019-05-21 PROCEDURE — 3017F COLORECTAL CA SCREEN DOC REV: CPT | Performed by: NURSE PRACTITIONER

## 2019-05-21 PROCEDURE — 1123F ACP DISCUSS/DSCN MKR DOCD: CPT | Performed by: NURSE PRACTITIONER

## 2019-05-21 PROCEDURE — 1036F TOBACCO NON-USER: CPT | Performed by: NURSE PRACTITIONER

## 2019-05-21 PROCEDURE — G8427 DOCREV CUR MEDS BY ELIG CLIN: HCPCS | Performed by: NURSE PRACTITIONER

## 2019-05-21 PROCEDURE — G8598 ASA/ANTIPLAT THER USED: HCPCS | Performed by: NURSE PRACTITIONER

## 2019-05-21 PROCEDURE — 4040F PNEUMOC VAC/ADMIN/RCVD: CPT | Performed by: NURSE PRACTITIONER

## 2019-05-21 PROCEDURE — G8417 CALC BMI ABV UP PARAM F/U: HCPCS | Performed by: NURSE PRACTITIONER
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
## 2019-05-24 ENCOUNTER — HOSPITAL ENCOUNTER (OUTPATIENT)
Age: 64
Setting detail: SPECIMEN
Discharge: HOME | End: 2019-05-24
Payer: MEDICARE

## 2019-05-24 LAB
ALBUMIN SERPL-MCNC: 3.9 G/DL (ref 3.5–5.2)
ALBUMIN/GLOBULIN RATIO: 1 (ref 1–2.5)
ALP BLD-CCNC: 84 U/L (ref 40–129)
ALT SERPL-CCNC: 10 U/L (ref 5–41)
ANION GAP SERPL CALCULATED.3IONS-SCNC: 11 MMOL/L (ref 9–17)
AST SERPL-CCNC: 17 U/L
BILIRUB SERPL-MCNC: 1.56 MG/DL (ref 0.3–1.2)
BNP INTERPRETATION: ABNORMAL
BUN BLDV-MCNC: 20 MG/DL (ref 8–23)
BUN/CREAT BLD: ABNORMAL (ref 9–20)
CALCIUM SERPL-MCNC: 9.3 MG/DL (ref 8.6–10.4)
CHLORIDE BLD-SCNC: 99 MMOL/L (ref 98–107)
CO2: 29 MMOL/L (ref 20–31)
CREAT SERPL-MCNC: 1.02 MG/DL (ref 0.7–1.2)
GFR AFRICAN AMERICAN: >60 ML/MIN
GFR NON-AFRICAN AMERICAN: >60 ML/MIN
GFR SERPL CREATININE-BSD FRML MDRD: ABNORMAL ML/MIN/{1.73_M2}
GFR SERPL CREATININE-BSD FRML MDRD: ABNORMAL ML/MIN/{1.73_M2}
GLUCOSE BLD-MCNC: 97 MG/DL (ref 70–99)
HCT VFR BLD CALC: 42.2 % (ref 40.7–50.3)
HEMOGLOBIN: 13.6 G/DL (ref 13–17)
MCH RBC QN AUTO: 31.6 PG (ref 25.2–33.5)
MCHC RBC AUTO-ENTMCNC: 32.2 G/DL (ref 28.4–34.8)
MCV RBC AUTO: 98.1 FL (ref 82.6–102.9)
NRBC AUTOMATED: 0 PER 100 WBC
PDW BLD-RTO: 15 % (ref 11.8–14.4)
PLATELET # BLD: 259 K/UL (ref 138–453)
PMV BLD AUTO: 9.8 FL (ref 8.1–13.5)
POTASSIUM SERPL-SCNC: 4.6 MMOL/L (ref 3.7–5.3)
PRO-BNP: 4366 PG/ML
RBC # BLD: 4.3 M/UL (ref 4.21–5.77)
SODIUM BLD-SCNC: 139 MMOL/L (ref 135–144)
TOTAL PROTEIN: 7.8 G/DL (ref 6.4–8.3)
WBC # BLD: 8.4 K/UL (ref 3.5–11.3)

## 2019-05-28 DIAGNOSIS — R79.89 ELEVATED BRAIN NATRIURETIC PEPTIDE (BNP) LEVEL: Primary | ICD-10-CM

## 2019-05-28 DIAGNOSIS — R60.1 GENERALIZED EDEMA: ICD-10-CM

## 2019-05-28 RX ORDER — BUMETANIDE 2 MG/1
2 TABLET ORAL DAILY
Qty: 10 TABLET | Refills: 0 | Status: SHIPPED | OUTPATIENT
Start: 2019-05-28 | End: 2019-06-12 | Stop reason: SDUPTHER

## 2019-06-10 ENCOUNTER — TELEPHONE (OUTPATIENT)
Dept: FAMILY MEDICINE CLINIC | Age: 64
End: 2019-06-10

## 2019-06-10 NOTE — TELEPHONE ENCOUNTER
Patient is calling for results on his lab testing.   He doesn't feel he needs to come in and would prefer a phone call

## 2019-06-12 DIAGNOSIS — R60.1 GENERALIZED EDEMA: ICD-10-CM

## 2019-06-12 DIAGNOSIS — R79.89 ELEVATED BRAIN NATRIURETIC PEPTIDE (BNP) LEVEL: ICD-10-CM

## 2019-06-12 RX ORDER — BUMETANIDE 2 MG/1
2 TABLET ORAL DAILY
Qty: 30 TABLET | Refills: 0 | Status: SHIPPED | OUTPATIENT
Start: 2019-06-12 | End: 2019-07-05 | Stop reason: SDUPTHER

## 2019-06-12 RX ORDER — POTASSIUM CHLORIDE 750 MG/1
10 TABLET, EXTENDED RELEASE ORAL 2 TIMES DAILY
Qty: 60 TABLET | Refills: 0 | Status: SHIPPED | OUTPATIENT
Start: 2019-06-12 | End: 2020-05-12 | Stop reason: DRUGHIGH

## 2019-06-12 NOTE — TELEPHONE ENCOUNTER
Spoke with Efra Garcia and he stated that the bumex did help. He had a couple side effects from the medication which were loose stool and no appetite but overall the medication has helped and the side effects are tolerable. He would like another prescription if possible.

## 2019-06-12 NOTE — TELEPHONE ENCOUNTER
Please notify Lubna Barriga that I have ordered bumex for 30 days along with potassium.  I will need to see him in 1 month

## 2019-07-05 DIAGNOSIS — R79.89 ELEVATED BRAIN NATRIURETIC PEPTIDE (BNP) LEVEL: ICD-10-CM

## 2019-07-05 DIAGNOSIS — R60.1 GENERALIZED EDEMA: ICD-10-CM

## 2019-07-05 RX ORDER — BUMETANIDE 2 MG/1
2 TABLET ORAL DAILY
Qty: 30 TABLET | Refills: 0 | Status: SHIPPED | OUTPATIENT
Start: 2019-07-05 | End: 2019-08-21 | Stop reason: SINTOL

## 2019-07-10 ENCOUNTER — OFFICE VISIT (OUTPATIENT)
Dept: FAMILY MEDICINE CLINIC | Age: 64
End: 2019-07-10
Payer: MEDICARE

## 2019-07-10 VITALS
TEMPERATURE: 97.8 F | SYSTOLIC BLOOD PRESSURE: 132 MMHG | WEIGHT: 315 LBS | BODY MASS INDEX: 48.96 KG/M2 | OXYGEN SATURATION: 96 % | HEART RATE: 65 BPM | DIASTOLIC BLOOD PRESSURE: 84 MMHG

## 2019-07-10 DIAGNOSIS — R60.1 GENERALIZED EDEMA: Primary | ICD-10-CM

## 2019-07-10 DIAGNOSIS — K70.9 LIVER DISEASE, ALCOHOLIC (HCC): ICD-10-CM

## 2019-07-10 PROCEDURE — G8598 ASA/ANTIPLAT THER USED: HCPCS | Performed by: NURSE PRACTITIONER

## 2019-07-10 PROCEDURE — 99212 OFFICE O/P EST SF 10 MIN: CPT | Performed by: NURSE PRACTITIONER

## 2019-07-10 PROCEDURE — G8427 DOCREV CUR MEDS BY ELIG CLIN: HCPCS | Performed by: NURSE PRACTITIONER

## 2019-07-10 PROCEDURE — 4040F PNEUMOC VAC/ADMIN/RCVD: CPT | Performed by: NURSE PRACTITIONER

## 2019-07-10 PROCEDURE — G8417 CALC BMI ABV UP PARAM F/U: HCPCS | Performed by: NURSE PRACTITIONER

## 2019-07-10 PROCEDURE — 1123F ACP DISCUSS/DSCN MKR DOCD: CPT | Performed by: NURSE PRACTITIONER

## 2019-07-10 PROCEDURE — 3017F COLORECTAL CA SCREEN DOC REV: CPT | Performed by: NURSE PRACTITIONER

## 2019-07-10 PROCEDURE — 1036F TOBACCO NON-USER: CPT | Performed by: NURSE PRACTITIONER

## 2019-07-10 NOTE — PROGRESS NOTES
neck stiffness. · Skin: Negative for rash and wound. · Allergic/Immunologic: Negative for environmental allergies and food allergies. · Neurological:  Negative for dizziness, light-headedness, numbness and headaches. · Hematological:  Negative for adenopathy. Does not bruise/bleed easily. · Psychiatric/Behavioral: Negative for self-injury, sleep disturbance and suicidal ideas. Objective     PHYSICAL EXAM:   · Constitutional: Peter Tyler is oriented to person, place, and time. Vital signs are normal. Appears well-developed and well-nourished. · HEENT:   · Head: Normocephalic and atraumatic. Right Ear: Hearing and external ear normal.     · Left Ear: Hearing and external ear normal.    · Nose: Nares normal.   · Eyes:PERRL, EOMI, Conjunctiva normal, No discharge. · Neck: Full passive range of motion. · Cardiovascular: Normal rate, regular rhythm, S1, S2, no murmur, no gallop, no friction rub, intact distal pulses. Significant non-pitting edema to right lower leg  · Pulmonary/Chest: Breath sounds are clear throughout, No respiratory distress, No wheezing, No chest tenderness. Effort normal  · Abdominal: Soft. Morbidly obese appearance, bowel sounds are present and normoactive. There is no hepatosplenomegaly. There is no tenderness. There is no CVA tenderness. · Neurological: Alert and oriented to person, place, and time. Normal motor function, Normal sensory function, No focal deficits noted. He has normal strength. · Skin: Skin is warm, dry and intact. No obvious lesions on exposed skin - dressing covering right lower leg wound intact and dry - not uncovered for inspection  · Psychiatric: Normal mood and affect. Speech is normal and behavior is normal.       Nursing note and vitals reviewed. Blood pressure 132/84, pulse 65, temperature 97.8 °F (36.6 °C), temperature source Temporal, weight (!) 361 lb (163.7 kg), SpO2 96 %. Body mass index is 48.96 kg/m².     Wt Readings from Last 3 Encounters: time in counseling, explanation of diagnosis, planning of further management, and answering all questions. Signed:  Elza De La O CNP    This note is created with the assistance of a speech-recognition program.  While intending to generate a document that actually reflects the content of the visit, no guarantees can be provided that every mistake has been identified and corrected by editing.

## 2019-07-31 ENCOUNTER — TELEPHONE (OUTPATIENT)
Dept: FAMILY MEDICINE CLINIC | Age: 64
End: 2019-07-31

## 2019-07-31 DIAGNOSIS — L03.115 CELLULITIS OF RIGHT LOWER LEG: Primary | ICD-10-CM

## 2019-07-31 RX ORDER — SULFAMETHOXAZOLE AND TRIMETHOPRIM 800; 160 MG/1; MG/1
1 TABLET ORAL 2 TIMES DAILY
Qty: 20 TABLET | Refills: 0 | Status: SHIPPED | OUTPATIENT
Start: 2019-07-31 | End: 2019-08-10

## 2019-07-31 NOTE — TELEPHONE ENCOUNTER
Patient says his leg has \"flared up again\"  He would like an antibiotic sent to the pharmacy for him. He says he knows it is infected because it is red, has a line going around the leg. Will you send rx to the pharmacy?  Patient advised to check his pharmacy later tonight

## 2019-08-19 ENCOUNTER — HOSPITAL ENCOUNTER (OUTPATIENT)
Dept: PHARMACY | Age: 64
Setting detail: THERAPIES SERIES
Discharge: HOME | End: 2019-08-19
Payer: MEDICARE

## 2019-08-19 DIAGNOSIS — I48.91 ATRIAL FIBRILLATION WITH RAPID VENTRICULAR RESPONSE (HCC): ICD-10-CM

## 2019-08-19 PROCEDURE — 99211 OFF/OP EST MAY X REQ PHY/QHP: CPT

## 2019-08-19 NOTE — PROGRESS NOTES
Outpatient Anticoagulation Service -  Apixaban (Eliquis) Management    Visit - Follow-up 1- 6 months of therapy     Indication for therapy: Atrial Fibrillation. Started Eliquis therapy: 19    Estimated duration of therapy: Indefinite. Prescribed Eliquis Dose: 5mg BID  (Afib pts with at least 2 of the following: Age > 80, Weight < 60kg, or SCr > 1.5mg/dL, the recommended dose is 2.5mg BID)    Dose Appropriate: Yes. Labs Reviewed:  SCr: 1.02 mg/dl on      Estimated CrCl ([place]ro[place]-Gault): 165 ml/min     Hemoglobin 13.6 g/dL on     Current Meds Reviewed: Yes. Drug Interactions Identified: None  Patient taking medications as prescribed:  Yes   Issues identified: None     Counseling points included:  1. Assessment of adherence with Eliquis regimen   At what time do you take your doses? 9am-9pm   How many doses have you missed? One every once in a while   2. Assessment of adverse events - Patient denies any bleeding or thromboembolic events. 3.Have you had any visits to the emergency department since last visit? No  4. Have you had any hospitalizations since last visit? No  5. Assessment of side effects - None identified. 6. Reviewed potential interactions   A. Avoid concomitant use with strong CY inhibitors                             (Ketoconazole, Ritonavir, Clarithromycin, Diltiazem, Erythromycin, Fluconazole, Verapamil) or strong CY inducers (Carbamazepine, Phenytoin, Rifampin, La Salle's Wort)   B. Grapefruit juice may increase levels/effects of Eliquis - use caution  7. Other patient concerns - No  8. Reminder to contact prescriber when:   A. Changes made to other medications   B. Signs or symptoms of VTE or stroke   C. Uncontrolled bleeding or unusual bruising  9. Affordability - Patient is obtaining prescription from from patient assistance program through [address] [place] [address] for no charge    Answered all medication-related questions and patient verbalized understanding.     Material provided

## 2019-09-03 ENCOUNTER — HOSPITAL ENCOUNTER (OUTPATIENT)
Age: 64
Discharge: HOME | End: 2019-09-03
Payer: MEDICARE

## 2019-09-03 DIAGNOSIS — I50.23 ACUTE ON CHRONIC SYSTOLIC CONGESTIVE HEART FAILURE (HCC): ICD-10-CM

## 2019-09-03 DIAGNOSIS — Z95.2 H/O MITRAL VALVE REPLACEMENT: ICD-10-CM

## 2019-09-03 DIAGNOSIS — I50.22 CHRONIC SYSTOLIC CONGESTIVE HEART FAILURE, NYHA CLASS 3 (HCC): ICD-10-CM

## 2019-09-03 LAB
ANION GAP SERPL CALCULATED.3IONS-SCNC: 10 MMOL/L (ref 9–17)
BNP INTERPRETATION: ABNORMAL
BUN BLDV-MCNC: 18 MG/DL (ref 8–23)
BUN/CREAT BLD: ABNORMAL (ref 9–20)
CALCIUM SERPL-MCNC: 9 MG/DL (ref 8.6–10.4)
CHLORIDE BLD-SCNC: 97 MMOL/L (ref 98–107)
CO2: 29 MMOL/L (ref 20–31)
CREAT SERPL-MCNC: 0.9 MG/DL (ref 0.7–1.2)
GFR AFRICAN AMERICAN: >60 ML/MIN
GFR NON-AFRICAN AMERICAN: >60 ML/MIN
GFR SERPL CREATININE-BSD FRML MDRD: ABNORMAL ML/MIN/{1.73_M2}
GFR SERPL CREATININE-BSD FRML MDRD: ABNORMAL ML/MIN/{1.73_M2}
GLUCOSE BLD-MCNC: 121 MG/DL (ref 70–99)
POTASSIUM SERPL-SCNC: 4.2 MMOL/L (ref 3.7–5.3)
PRO-BNP: 3538 PG/ML
SODIUM BLD-SCNC: 136 MMOL/L (ref 135–144)

## 2019-09-03 PROCEDURE — 36415 COLL VENOUS BLD VENIPUNCTURE: CPT

## 2019-09-03 PROCEDURE — 83880 ASSAY OF NATRIURETIC PEPTIDE: CPT

## 2019-09-03 PROCEDURE — 80048 BASIC METABOLIC PNL TOTAL CA: CPT

## 2019-09-24 PROBLEM — E78.5 HYPERLIPIDEMIA: Status: ACTIVE | Noted: 2019-09-24

## 2019-09-24 PROBLEM — Z79.899 LONG TERM CURRENT USE OF ANTIARRHYTHMIC DRUG: Status: ACTIVE | Noted: 2019-09-24

## 2019-09-24 PROBLEM — R60.0 BILATERAL LEG EDEMA: Status: ACTIVE | Noted: 2019-09-24

## 2019-12-11 ENCOUNTER — TELEPHONE (OUTPATIENT)
Dept: FAMILY MEDICINE CLINIC | Age: 64
End: 2019-12-11

## 2020-01-21 ENCOUNTER — NURSE TRIAGE (OUTPATIENT)
Dept: OTHER | Facility: CLINIC | Age: 65
End: 2020-01-21

## 2020-01-22 ENCOUNTER — OFFICE VISIT (OUTPATIENT)
Dept: FAMILY MEDICINE CLINIC | Age: 65
End: 2020-01-22
Payer: MEDICARE

## 2020-01-22 VITALS
DIASTOLIC BLOOD PRESSURE: 88 MMHG | SYSTOLIC BLOOD PRESSURE: 132 MMHG | HEART RATE: 66 BPM | WEIGHT: 315 LBS | OXYGEN SATURATION: 92 % | BODY MASS INDEX: 51.81 KG/M2 | TEMPERATURE: 98.5 F

## 2020-01-22 PROCEDURE — G8484 FLU IMMUNIZE NO ADMIN: HCPCS | Performed by: NURSE PRACTITIONER

## 2020-01-22 PROCEDURE — 3017F COLORECTAL CA SCREEN DOC REV: CPT | Performed by: NURSE PRACTITIONER

## 2020-01-22 PROCEDURE — 1123F ACP DISCUSS/DSCN MKR DOCD: CPT | Performed by: NURSE PRACTITIONER

## 2020-01-22 PROCEDURE — 93000 ELECTROCARDIOGRAM COMPLETE: CPT | Performed by: NURSE PRACTITIONER

## 2020-01-22 PROCEDURE — 99214 OFFICE O/P EST MOD 30 MIN: CPT | Performed by: NURSE PRACTITIONER

## 2020-01-22 PROCEDURE — 4040F PNEUMOC VAC/ADMIN/RCVD: CPT | Performed by: NURSE PRACTITIONER

## 2020-01-22 PROCEDURE — G8417 CALC BMI ABV UP PARAM F/U: HCPCS | Performed by: NURSE PRACTITIONER

## 2020-01-22 PROCEDURE — 1036F TOBACCO NON-USER: CPT | Performed by: NURSE PRACTITIONER

## 2020-01-22 PROCEDURE — 96372 THER/PROPH/DIAG INJ SC/IM: CPT | Performed by: NURSE PRACTITIONER

## 2020-01-22 PROCEDURE — G8427 DOCREV CUR MEDS BY ELIG CLIN: HCPCS | Performed by: NURSE PRACTITIONER

## 2020-01-22 RX ORDER — METHYLPREDNISOLONE SODIUM SUCCINATE 125 MG/2ML
125 INJECTION, POWDER, LYOPHILIZED, FOR SOLUTION INTRAMUSCULAR; INTRAVENOUS ONCE
Status: COMPLETED | OUTPATIENT
Start: 2020-01-22 | End: 2020-01-22

## 2020-01-22 RX ORDER — SULFAMETHOXAZOLE AND TRIMETHOPRIM 800; 160 MG/1; MG/1
1 TABLET ORAL 2 TIMES DAILY
Qty: 20 TABLET | Refills: 0 | Status: SHIPPED | OUTPATIENT
Start: 2020-01-22 | End: 2020-02-01

## 2020-01-22 RX ORDER — PREDNISONE 10 MG/1
10 TABLET ORAL
Qty: 15 TABLET | Refills: 0 | Status: SHIPPED | OUTPATIENT
Start: 2020-01-22 | End: 2020-01-27

## 2020-01-22 RX ADMIN — METHYLPREDNISOLONE SODIUM SUCCINATE 125 MG: 125 INJECTION, POWDER, LYOPHILIZED, FOR SOLUTION INTRAMUSCULAR; INTRAVENOUS at 11:16

## 2020-01-22 ASSESSMENT — PATIENT HEALTH QUESTIONNAIRE - PHQ9
SUM OF ALL RESPONSES TO PHQ QUESTIONS 1-9: 0
SUM OF ALL RESPONSES TO PHQ QUESTIONS 1-9: 0
SUM OF ALL RESPONSES TO PHQ9 QUESTIONS 1 & 2: 0
2. FEELING DOWN, DEPRESSED OR HOPELESS: 0
1. LITTLE INTEREST OR PLEASURE IN DOING THINGS: 0
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
## 2020-01-27 ENCOUNTER — TELEPHONE (OUTPATIENT)
Dept: FAMILY MEDICINE CLINIC | Age: 65
End: 2020-01-27

## 2020-01-27 NOTE — TELEPHONE ENCOUNTER
This is an antibiotic used in the treatment of skin infections and pneumonia    It is a strong antibiotic that should not be taken without a provider prescribing this for him

## 2020-01-27 NOTE — TELEPHONE ENCOUNTER
Patient has left over \"LINEZOLID\" and wants to know what to do it. States the pills are 100 dollars each.

## 2020-01-31 ENCOUNTER — HOSPITAL ENCOUNTER (OUTPATIENT)
Dept: CARDIAC CATH/INVASIVE PROCEDURES | Age: 65
Discharge: HOME | End: 2020-01-31
Payer: MEDICARE

## 2020-01-31 VITALS
RESPIRATION RATE: 18 BRPM | DIASTOLIC BLOOD PRESSURE: 73 MMHG | HEART RATE: 53 BPM | WEIGHT: 315 LBS | OXYGEN SATURATION: 93 % | SYSTOLIC BLOOD PRESSURE: 107 MMHG | TEMPERATURE: 97.9 F | HEIGHT: 71 IN | BODY MASS INDEX: 44.1 KG/M2

## 2020-01-31 LAB
ANION GAP SERPL CALCULATED.3IONS-SCNC: 7 MMOL/L (ref 9–17)
CHLORIDE BLD-SCNC: 96 MMOL/L (ref 98–107)
CO2: 29 MMOL/L (ref 20–31)
POTASSIUM SERPL-SCNC: 5.2 MMOL/L (ref 3.7–5.3)
SODIUM BLD-SCNC: 132 MMOL/L (ref 135–144)

## 2020-01-31 PROCEDURE — 80051 ELECTROLYTE PANEL: CPT

## 2020-01-31 PROCEDURE — 7100000011 HC PHASE II RECOVERY - ADDTL 15 MIN

## 2020-01-31 PROCEDURE — 92960 CARDIOVERSION ELECTRIC EXT: CPT | Performed by: INTERNAL MEDICINE

## 2020-01-31 PROCEDURE — 7100000010 HC PHASE II RECOVERY - FIRST 15 MIN

## 2020-01-31 PROCEDURE — 6360000002 HC RX W HCPCS

## 2020-01-31 PROCEDURE — 2580000003 HC RX 258: Performed by: INTERNAL MEDICINE

## 2020-01-31 PROCEDURE — 93005 ELECTROCARDIOGRAM TRACING: CPT

## 2020-01-31 RX ORDER — 0.9 % SODIUM CHLORIDE 0.9 %
250 INTRAVENOUS SOLUTION INTRAVENOUS ONCE
Status: COMPLETED | OUTPATIENT
Start: 2020-01-31 | End: 2020-01-31

## 2020-01-31 RX ORDER — SODIUM CHLORIDE 9 MG/ML
INJECTION, SOLUTION INTRAVENOUS CONTINUOUS
Status: DISCONTINUED | OUTPATIENT
Start: 2020-01-31 | End: 2020-02-01 | Stop reason: HOSPADM

## 2020-01-31 RX ADMIN — SODIUM CHLORIDE: 9 INJECTION, SOLUTION INTRAVENOUS at 14:18

## 2020-01-31 RX ADMIN — SODIUM CHLORIDE 250 ML: 9 INJECTION, SOLUTION INTRAVENOUS at 15:26

## 2020-01-31 ASSESSMENT — PAIN SCALES - GENERAL
PAINLEVEL_OUTOF10: 0

## 2020-01-31 ASSESSMENT — PAIN - FUNCTIONAL ASSESSMENT: PAIN_FUNCTIONAL_ASSESSMENT: 0-10

## 2020-01-31 NOTE — PROGRESS NOTES
Patient's blood pressure continues to be 80s/40s, hr now sustaining in the 40s, occasionally in the 30s, sinus [place]. Writer noted that patient takes digoxin and sotolol at home and is due for sotolol tonight. Patient told writer he never checks his hr or blood pressure before taking these beds, writer called Dr Alyssa Latham and notified him of this and current vital signs and asked if he wanted either of these meds held tonight or tomorrow. Dr Alyssa Latham said patient should go home and resume all home medication without holding anything and should call his office if he feels dizzy.

## 2020-01-31 NOTE — PROCEDURES
20  Elective Cardioversion Note     Name:  Adonay Powers PCP: ROMEL Muller CNP   MRN:  [id number]     : 1955 Hospital: [facility]     Procedure Note   The H&P for this patent has been performed with in the last thirty days and is located in the paper chart. The H&P was reviewed and there are no changes in the patients condition. The planned procedure is DC cardioversion. Cardiologist: Alyssa Lyn MD, [facility]   Preprocedure Diagnosis: Atrial Fibrillation   Postprocedure Diagnosis: Successful conversion to Normal Sinus Rhythm   ASA Classification: 3   Mallampati Classification: Class III   Sedation start time: 1500 Hours   Sedation end time: 1545 Hours   Anesthesia: Versed 6 mg and Fentanyl 50 mcg   Post Procedure Evaluation: No anesthesia related complications, returned to anticipated LOC, cardiopulmonary and hydration status. Pain and nausea adequately controlled. Complications: None   Pad/Paddles Location: Pads- anterior/posterior   Number of Attempts: 1   Maximum Energy: 360 Joules     Nursing patient history form reviewed. No changes noted. The risk, benefits, and alternatives were discussed with the patient. Written informed consent was obtained and a final time out was performed. The rhythm was confirmed, then with continuous electrocardiographic, non-invasive hemodynamic and oximetric monitoring, concious sedation was initiated. Once the patient was appropriately sedated, DC cardioversion was performed as noted above. An electrocardiogram was ordered and the patient was observed. Once the patient was fully awake and vital signes were stable, they were discharged home with family/friends.      Electronically Signed By: Geronimo Wang MD, [facility]    ____________________________________________  [facility]   [address]  Tel:  [phone]      Fax:  [phone]  www. [facility]. [facility]

## 2020-02-01 LAB
EKG ATRIAL RATE: 60 BPM
EKG Q-T INTERVAL: 424 MS
EKG QRS DURATION: 98 MS
EKG QTC CALCULATION (BAZETT): 464 MS
EKG R AXIS: 67 DEGREES
EKG T AXIS: 47 DEGREES
EKG VENTRICULAR RATE: 72 BPM

## 2020-02-17 ENCOUNTER — OFFICE VISIT (OUTPATIENT)
Dept: FAMILY MEDICINE CLINIC | Age: 65
End: 2020-02-17
Payer: MEDICARE

## 2020-02-17 VITALS
TEMPERATURE: 97.5 F | WEIGHT: 315 LBS | DIASTOLIC BLOOD PRESSURE: 88 MMHG | SYSTOLIC BLOOD PRESSURE: 148 MMHG | OXYGEN SATURATION: 96 % | BODY MASS INDEX: 50.21 KG/M2 | HEART RATE: 64 BPM

## 2020-02-17 PROCEDURE — G8427 DOCREV CUR MEDS BY ELIG CLIN: HCPCS | Performed by: NURSE PRACTITIONER

## 2020-02-17 PROCEDURE — 96372 THER/PROPH/DIAG INJ SC/IM: CPT | Performed by: NURSE PRACTITIONER

## 2020-02-17 PROCEDURE — 1123F ACP DISCUSS/DSCN MKR DOCD: CPT | Performed by: NURSE PRACTITIONER

## 2020-02-17 PROCEDURE — 1036F TOBACCO NON-USER: CPT | Performed by: NURSE PRACTITIONER

## 2020-02-17 PROCEDURE — 4040F PNEUMOC VAC/ADMIN/RCVD: CPT | Performed by: NURSE PRACTITIONER

## 2020-02-17 PROCEDURE — G8417 CALC BMI ABV UP PARAM F/U: HCPCS | Performed by: NURSE PRACTITIONER

## 2020-02-17 PROCEDURE — 99214 OFFICE O/P EST MOD 30 MIN: CPT | Performed by: NURSE PRACTITIONER

## 2020-02-17 PROCEDURE — G8484 FLU IMMUNIZE NO ADMIN: HCPCS | Performed by: NURSE PRACTITIONER

## 2020-02-17 PROCEDURE — 3017F COLORECTAL CA SCREEN DOC REV: CPT | Performed by: NURSE PRACTITIONER

## 2020-02-17 RX ORDER — PREDNISONE 10 MG/1
10 TABLET ORAL
Qty: 15 TABLET | Refills: 0 | Status: SHIPPED | OUTPATIENT
Start: 2020-02-17 | End: 2020-02-22

## 2020-02-17 RX ORDER — METHYLPREDNISOLONE SODIUM SUCCINATE 125 MG/2ML
125 INJECTION, POWDER, LYOPHILIZED, FOR SOLUTION INTRAMUSCULAR; INTRAVENOUS ONCE
Status: COMPLETED | OUTPATIENT
Start: 2020-02-17 | End: 2020-02-17

## 2020-02-17 RX ADMIN — METHYLPREDNISOLONE SODIUM SUCCINATE 125 MG: 125 INJECTION, POWDER, LYOPHILIZED, FOR SOLUTION INTRAMUSCULAR; INTRAVENOUS at 15:59

## 2020-02-17 NOTE — PROGRESS NOTES
Nael Monzon, P-C  [address]  [id number]  Valente Beyer  [place], [facility]  [phone]  [phone]    Shubham Wisdom is a 75 y.o. male who is here with c/o of:    Chief Complaint: Rash (Been on and off for a month/ on face )      Patient Accompanied by: n/a    HPI - Shubham Wisdom is here today with c/o:    Rash  Patient was here about 1 month ago with similar rash (hives) was treated with prednisone and rash completely resolved  Over the past 2-3 days the rash has returned  He denies exposure to any new substances or environmental factors    Irregular heart beat  Patient was also in A-fib at his last appointment   He has since seen cardiology and had cardioversion  He denies palpitations, chest pain, shortness of breath  Current meds: betapace 80mg daily, eliquis 5mg 2 times daily, lasix 40mg daily, potassium 10meq daily, digoxin 125mcg daily    Liver disease  Patient denies currently drinking alcohol  He has not had recent labs      Patient Active Problem List:     Pancreatitis due to biliary obstruction     Atrial fibrillation ([address]     Morbid obesity with BMI of 45.0-49.9, adult ([address]     Hypokalemia     Chronic osteomyelitis involving lower leg ([address]     H/O mitral valve replacement with tissue graft     Acute pancreatitis     Liver disease, alcoholic (HCC)     S/P cholecystectomy     Osteomyelitis of right lower extremity ([address]     Hyponatremia     Hypokalemia     Leukocytosis     Hypoxemia     Hypertension     Acute cystitis with hematuria     Generalized weakness     Atrial fibrillation with rapid ventricular response ([address]     Hypomagnesemia     Urinary tract infection without hematuria     Acute on chronic congestive heart failure (HCC)     Chronic systolic congestive heart failure, NYHA class 3 (HCC)     Mild concentric left ventricular hypertrophy (LVH)     Dilated cardiomyopathy ([address]     Current use of long term anticoagulation     Dyslipidemia · Musculoskeletal: Negative for arthralgias, back pain, joint swelling, myalgias, neck pain and neck stiffness. · Skin: Negative for wound. Positive for rash  · Allergic/Immunologic: Negative for environmental allergies and food allergies. · Neurological:  Negative for dizziness, light-headedness, numbness and headaches. · Hematological:  Negative for adenopathy. Does not bruise/bleed easily. · Psychiatric/Behavioral: Negative for self-injury, sleep disturbance and suicidal ideas. Objective     PHYSICAL EXAM:   · Constitutional: Mariam Kendall is oriented to person, place, and time. Vital signs are normal. Appears well-developed and well-nourished. · HEENT:   · Head: Normocephalic and atraumatic. Right Ear: Hearing and external ear normal. TM normal  Canal normal  · Left Ear: Hearing and external ear normal. TM normal Canal normal  · Nose: Nares normal. Septum midline. No drainage or sinus tenderness. Mucosa pink and moist  · Mouth/Throat: Oropharynx- No erythema, no exudate. Uvula midline, no erythema, no edema. Mucous membranes are pink and moist.   · Eyes:PERRL, EOMI, Conjunctiva normal, No discharge. · Neck: Full passive range of motion. Non-tender on palpation. Neck supple. No thyromegaly present. Trachea normal.  · Cardiovascular: Normal rate, regular rhythm, S1, S2, no murmur, no gallop, no friction rub, intact distal pulses. · Pulmonary/Chest: Breath sounds are clear throughout, No respiratory distress, No wheezing, No chest tenderness. Effort normal  · Abdominal: Soft. Morbidly obese appearance, bowel sounds are present and normoactive. There is no hepatosplenomegaly. There is no tenderness. There is no CVA tenderness. · Musculoskeletal: Extremities appear regular and symmetric. No evident masses, lesions, foreign bodies, or other abnormalities. No edema. No tenderness on palpation. Joints are stable. Full ROM, strength and tone are within normal limits.   Right lower leg open wound - Open wound of right lower leg, subsequent encounter  - continue wound care  - chronic issues and no changes    3. Liver disease, alcoholic ([facility]  - I have strongly encouraged patient to have labs completed  - CBC; Future  - Comprehensive Metabolic Panel; Future    4. Morbid obesity with BMI of 50.0-59.9, adult ([facility]  - reviewed diet and exercise - patient is not interested at this time    5. Screening cholesterol level  - Lipid, Fasting; Future    6. Diabetes mellitus screening  - Glucose, Fasting; Future    7. Encounter for colorectal cancer screening  - Cologuard (For External Results Only); Future      Return in about 3 months (around 5/17/2020) for Routine follow up of chronic conditions. 1.  Mariam Kendall received counseling on the following healthy behaviors: nutrition, exercise and medication adherence  2. Patient given educational materials - see patient instructions  3. Was a self-tracking handout given in paper form or via [employer]? No  If yes, see orders or list here. 4.  Discussed use, benefit, and side effects of prescribed medications. Barriers to medication compliance addressed. All patient questions answered. Pt voiced understanding. 5.  Reviewed prior labs, imaging, consultation, follow up, and health maintenance  6. Continue current medications, diet and exercise. 7. Discussed use, benefit, and side effects of prescribed medications. Barriers to medication compliance addressed. All her questions were answered. Pt voiced understanding. Mariam Kendall will continue current medications, diet and exercise. Patient given educational materials on diet and exercise    Of the 25 minute duration appointment visit, Conner Ahumada CNP spent at least 50% of the face-to-face time in counseling, explanation of diagnosis, planning of further management, and answering all questions.     Signed:  Conner Ahumada CNP    This note is created with the assistance of a speech-recognition program.  While intending to generate a document that actually reflects the content of the visit, no guarantees can be provided that every mistake has been identified and corrected by editing.

## 2020-02-19 ENCOUNTER — TELEPHONE (OUTPATIENT)
Dept: FAMILY MEDICINE CLINIC | Age: 65
End: 2020-02-19

## 2020-02-20 ENCOUNTER — HOSPITAL ENCOUNTER (OUTPATIENT)
Age: 65
Discharge: HOME | End: 2020-02-20
Payer: MEDICARE

## 2020-02-20 LAB
ALBUMIN SERPL-MCNC: 3.9 G/DL (ref 3.5–5.2)
ALBUMIN/GLOBULIN RATIO: 0.9 (ref 1–2.5)
ALP BLD-CCNC: 102 U/L (ref 40–129)
ALT SERPL-CCNC: 25 U/L (ref 5–41)
ANION GAP SERPL CALCULATED.3IONS-SCNC: 16 MMOL/L (ref 9–17)
AST SERPL-CCNC: 30 U/L
BILIRUB SERPL-MCNC: 0.6 MG/DL (ref 0.3–1.2)
BUN BLDV-MCNC: 33 MG/DL (ref 8–23)
BUN/CREAT BLD: ABNORMAL (ref 9–20)
CALCIUM SERPL-MCNC: 9.5 MG/DL (ref 8.6–10.4)
CHLORIDE BLD-SCNC: 98 MMOL/L (ref 98–107)
CHOLESTEROL, FASTING: 139 MG/DL
CHOLESTEROL/HDL RATIO: 3.4
CO2: 26 MMOL/L (ref 20–31)
CREAT SERPL-MCNC: 1.27 MG/DL (ref 0.7–1.2)
GFR AFRICAN AMERICAN: >60 ML/MIN
GFR NON-AFRICAN AMERICAN: 57 ML/MIN
GFR SERPL CREATININE-BSD FRML MDRD: ABNORMAL ML/MIN/{1.73_M2}
GFR SERPL CREATININE-BSD FRML MDRD: ABNORMAL ML/MIN/{1.73_M2}
GLUCOSE BLD-MCNC: 92 MG/DL (ref 70–99)
GLUCOSE FASTING: 92 MG/DL (ref 70–99)
HCT VFR BLD CALC: 45.9 % (ref 40.7–50.3)
HDLC SERPL-MCNC: 41 MG/DL
HEMOGLOBIN: 14.5 G/DL (ref 13–17)
LDL CHOLESTEROL: 74 MG/DL (ref 0–130)
MCH RBC QN AUTO: 32.2 PG (ref 25.2–33.5)
MCHC RBC AUTO-ENTMCNC: 31.6 G/DL (ref 28.4–34.8)
MCV RBC AUTO: 102 FL (ref 82.6–102.9)
NRBC AUTOMATED: 0 PER 100 WBC
PDW BLD-RTO: 15.2 % (ref 11.8–14.4)
PLATELET # BLD: 302 K/UL (ref 138–453)
PMV BLD AUTO: 9.9 FL (ref 8.1–13.5)
POTASSIUM SERPL-SCNC: 4.6 MMOL/L (ref 3.7–5.3)
RBC # BLD: 4.5 M/UL (ref 4.21–5.77)
SODIUM BLD-SCNC: 140 MMOL/L (ref 135–144)
TOTAL PROTEIN: 8.1 G/DL (ref 6.4–8.3)
TRIGLYCERIDE, FASTING: 118 MG/DL
VLDLC SERPL CALC-MCNC: NORMAL MG/DL (ref 1–30)
WBC # BLD: 9.3 K/UL (ref 3.5–11.3)

## 2020-02-20 PROCEDURE — 80061 LIPID PANEL: CPT

## 2020-02-20 PROCEDURE — 36415 COLL VENOUS BLD VENIPUNCTURE: CPT

## 2020-02-20 PROCEDURE — 85027 COMPLETE CBC AUTOMATED: CPT

## 2020-02-20 PROCEDURE — 80053 COMPREHEN METABOLIC PANEL: CPT

## 2020-03-25 PROBLEM — E78.5 DYSLIPIDEMIA: Status: RESOLVED | Noted: 2019-05-09 | Resolved: 2020-03-24

## 2020-05-11 ENCOUNTER — TELEPHONE (OUTPATIENT)
Dept: FAMILY MEDICINE CLINIC | Age: 65
End: 2020-05-11

## 2020-05-11 NOTE — TELEPHONE ENCOUNTER
Pt called requesting something for inflammation. His joints hurt all over. Both legs are swollen and both ankles hurt. Please send to [facility] in [place]. Please advise.

## 2020-05-12 ENCOUNTER — VIRTUAL VISIT (OUTPATIENT)
Dept: FAMILY MEDICINE CLINIC | Age: 65
End: 2020-05-12
Payer: MEDICARE

## 2020-05-12 VITALS — HEIGHT: 72 IN | WEIGHT: 315 LBS | BODY MASS INDEX: 42.66 KG/M2

## 2020-05-12 PROCEDURE — 99442 PR PHYS/QHP TELEPHONE EVALUATION 11-20 MIN: CPT | Performed by: NURSE PRACTITIONER

## 2020-05-12 RX ORDER — SULFAMETHOXAZOLE AND TRIMETHOPRIM 800; 160 MG/1; MG/1
1 TABLET ORAL 2 TIMES DAILY
Qty: 10 TABLET | Refills: 0 | Status: SHIPPED | OUTPATIENT
Start: 2020-05-12 | End: 2020-05-17

## 2020-05-12 RX ORDER — PREDNISONE 10 MG/1
10 TABLET ORAL
Qty: 15 TABLET | Refills: 0 | Status: SHIPPED | OUTPATIENT
Start: 2020-05-12 | End: 2020-05-17

## 2020-05-12 NOTE — TELEPHONE ENCOUNTER
Please ask him to schedule a visit with me today.  Add him at the end of the day if there are no open slots

## 2020-05-30 ENCOUNTER — VIRTUAL VISIT (OUTPATIENT)
Dept: FAMILY MEDICINE CLINIC | Age: 65
End: 2020-05-30
Payer: MEDICARE

## 2020-05-30 VITALS — RESPIRATION RATE: 18 BRPM | BODY MASS INDEX: 42.66 KG/M2 | HEIGHT: 72 IN | WEIGHT: 315 LBS

## 2020-05-30 PROCEDURE — 99497 ADVNCD CARE PLAN 30 MIN: CPT | Performed by: NURSE PRACTITIONER

## 2020-05-30 PROCEDURE — 3017F COLORECTAL CA SCREEN DOC REV: CPT | Performed by: NURSE PRACTITIONER

## 2020-05-30 PROCEDURE — 4040F PNEUMOC VAC/ADMIN/RCVD: CPT | Performed by: NURSE PRACTITIONER

## 2020-05-30 PROCEDURE — G0438 PPPS, INITIAL VISIT: HCPCS | Performed by: NURSE PRACTITIONER

## 2020-05-30 PROCEDURE — 1123F ACP DISCUSS/DSCN MKR DOCD: CPT | Performed by: NURSE PRACTITIONER

## 2020-05-30 ASSESSMENT — PATIENT HEALTH QUESTIONNAIRE - PHQ9
SUM OF ALL RESPONSES TO PHQ QUESTIONS 1-9: 2
SUM OF ALL RESPONSES TO PHQ QUESTIONS 1-9: 2

## 2020-05-30 ASSESSMENT — LIFESTYLE VARIABLES: HOW OFTEN DO YOU HAVE A DRINK CONTAINING ALCOHOL: 0

## 2020-05-30 NOTE — PATIENT INSTRUCTIONS
Personalized Preventive Plan for Abdias Blackwell - 5/30/2020  Medicare offers a range of preventive health benefits. Some of the tests and screenings are paid in full while other may be subject to a deductible, co-insurance, and/or copay. Some of these benefits include a comprehensive review of your medical history including lifestyle, illnesses that may run in your family, and various assessments and screenings as appropriate. After reviewing your medical record and screening and assessments performed today your provider may have ordered immunizations, labs, imaging, and/or referrals for you. A list of these orders (if applicable) as well as your Preventive Care list are included within your After Visit Summary for your review. Other Preventive Recommendations:    · A preventive eye exam performed by an eye specialist is recommended every 1-2 years to screen for glaucoma; cataracts, macular degeneration, and other eye disorders. · A preventive dental visit is recommended every 6 months. · Try to get at least 150 minutes of exercise per week or 10,000 steps per day on a pedometer . · Order or download the FREE \"Exercise & Physical Activity: Your Everyday Guide\" from [employer]. Call 9-757.559.7733 or search [employer] online. · You need 6912-4790 mg of calcium and 7167-3610 IU of vitamin D per day. It is possible to meet your calcium requirement with diet alone, but a vitamin D supplement is usually necessary to meet this goal.  · When exposed to the sun, use a sunscreen that protects against both UVA and UVB radiation with an SPF of 30 or greater. Reapply every 2 to 3 hours or after sweating, drying off with a towel, or swimming. · Always wear a seat belt when traveling in a car. Always wear a helmet when riding a bicycle or motorcycle.        Advance Directives: Care Instructions  Overview  An advance directive is a legal way to state your wishes at the end of your life. It tells your family and your doctor what to do if you can't say what you want. There are two main types of advance directives. You can change them any time your wishes change. Living will. This form tells your family and your doctor your wishes about life support and other treatment. The form is also called a declaration. Medical power of . This form lets you name a person to make treatment decisions for you when you can't speak for yourself. This person is called a health care agent (health care proxy, health care surrogate). The form is also called a durable power of  for health care. If you do not have an advance directive, decisions about your medical care may be made by a family member, or by a doctor or a  who doesn't know you. It may help to think of an advance directive as a gift to the people who care for you. If you have one, they won't have to make tough decisions by themselves. Follow-up care is a key part of your treatment and safety. Be sure to make and go to all appointments, and call your doctor if you are having problems. It's also a good idea to know your test results and keep a list of the medicines you take. What should you include in an advance directive? Many states have a unique advance directive form. (It may ask you to address specific issues.) Or you might use a universal form that's approved by many states. If your form doesn't tell you what to address, it may be hard to know what to include in your advance directive. Use the questions below to help you get started. Who do you want to make decisions about your medical care if you are not able to? What life-support measures do you want if you have a serious illness that gets worse over time or can't be cured? What are you most afraid of that might happen?  (Maybe you're afraid of having pain, losing your independence, or being kept alive by machines.)  Where would you prefer to die? (Your home? A hospital? A nursing home?)  Do you want to donate your organs when you die? Do you want certain Taoist practices performed before you die? When should you call for help? Be sure to contact your doctor if you have any questions. Where can you learn more? Go to https://chpepiceweb.anfix. org and sign in to your [employer]lt account. Enter R264 in the [employer] box to learn more about \"Advance Directives: Care Instructions. \"     If you do not have an account, please click on the \"Sign Up Now\" link. Current as of: December 9, 2019               Content Version: 12.5  © 0041-1995 Healthwise, [employer]. Care instructions adapted under license by [facility] ([facility]). If you have questions about a medical condition or this instruction, always ask your healthcare professional. [address] any warranty or liability for your use of this information. ·        Learning About Medical Power of   What is a medical power of ? A medical power of , also called a durable power of  for health care, is one type of the legal forms called advance directives. It lets you name the person you want to make treatment decisions for you if you can't speak or decide for yourself. The person you choose is called your health care agent. This person is also called a health care proxy or health care surrogate. A medical power of  may be called something else in your state. How do you choose a health care agent? Choose your health care agent carefully. This person may or may not be a family member. Talk to the person before you make your final decision. Make sure he or she is comfortable with this responsibility. It's a good idea to choose someone who:  Is at least 25years old. Knows you well and understands what makes life meaningful for you. Understands your Taoist and moral values.   Will do what you want, not what he or she wants.  Will be able to make difficult choices at a stressful time. Will be able to refuse or stop treatment, if that is what you would want, even if you could die. Will be firm and confident with health professionals if needed. Will ask questions to get needed information. Lives near you or agrees to travel to you if needed. Your family may help you make medical decisions while you can still be part of that process. But it's important to choose one person to be your health care agent in case you aren't able to make decisions for yourself. If you don't fill out the legal form and name a health care agent, the decisions your family can make may be limited. A health care agent may be called something else in your state. Who will make decisions for you if you don't have a health care agent? If you don't have a health care agent or a living will, you may not get the care you want. Decisions may be made by family members who disagree about your medical care. Or decisions may be made by a medical professional who doesn't know you well. In some cases, a  makes the decisions. When you name a health care agent, it is very clear who has the power to make health decisions for you. How do you name a health care agent? You name your health care agent on a legal form. This form is usually called a medical power of . Ask your hospital, state bar association, or office on aging where to find these forms. You must sign the form to make it legal. Some states require you to get the form notarized. This means that a person called a  watches you sign the form and then he or she signs the form. Some states also require that two or more witnesses sign the form. Be sure to tell your family members and doctors who your health care agent is. Where can you learn more? Go to https://chpepicewiraida.healthNexJ Systemspartners. org and sign in to your CloudJay account.  Enter [id number] in the [facility] box to learn more about \"Learning About [address]. \"     If you do not have an account, please click on the \"Sign Up Now\" link. Current as of: December 9, 2019               Content Version: 12.5  © 6160-0592 Healthwise, [employer]. Care instructions adapted under license by [facility] ([facility]). If you have questions about a medical condition or this instruction, always ask your healthcare professional. [address] any warranty or liability for your use of this information. ·        Learning About Living Perroy  What is a living will? A living will, also called a declaration, is a legal form. It tells your family and your doctor your wishes when you can't speak for yourself. It's used by the health professionals who will treat you as you near the end of your life or if you get seriously hurt or ill. If you put your wishes in writing, your loved ones and others will know what kind of care you want. They won't need to guess. This can ease your mind and be helpful to others. And you can change or cancel your living will at any time. A living will is not the same as an estate or property will. An estate will explains what you want to happen with your money and property after you die. How do you use it? A living will is used to describe the kinds of treatment or life support you want as you near the end of your life or if you get seriously hurt or ill. Keep these facts in mind about living mejia. Your living will is used only if you can't speak or make decisions for yourself. Most often, one or more doctors must certify that you can't speak or decide for yourself before your living will takes effect. If you get better and can speak for yourself again, you can accept or refuse any treatment. It doesn't matter what you said in your living will. Some states may limit your right to refuse treatment in certain cases.  For example, you may need to clearly state in your living will that
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
## 2020-06-08 ENCOUNTER — TELEPHONE (OUTPATIENT)
Dept: FAMILY MEDICINE CLINIC | Age: 65
End: 2020-06-08

## 2020-06-08 NOTE — TELEPHONE ENCOUNTER
My suggestion at this time is to decrease the Lasix back down to 20mg daily as his renal (kidney) function is decreased. He can increase again after 1 week for a short period of time if needed.

## 2020-06-08 NOTE — TELEPHONE ENCOUNTER
Patient states his edema has gone down some in legs. Don't hurt as bad. Bottom of his feet are swollen and painful  Patient is taking Lasix 40 mg in am and 20 mg pm   Potassium 20 meq 1 am 1 pm  Patient hoping to get leg swelling down more.

## 2020-07-06 ENCOUNTER — TELEPHONE (OUTPATIENT)
Dept: FAMILY MEDICINE CLINIC | Age: 65
End: 2020-07-06

## 2020-07-06 NOTE — TELEPHONE ENCOUNTER
Pt says he still having swelling in his calves. Right leg was bleeding but he got that stopped. He wants to know  If you will call in something for the swelling?   He said he cannot get Dr Murillo Oh office to listen to him

## 2020-07-06 NOTE — TELEPHONE ENCOUNTER
There is nothing further to send in for the swelling. He is currently taking a large dose of lasix.  Please ask if he would like a referral to a vascular surgeon for further evaluation

## 2020-07-23 ENCOUNTER — TELEPHONE (OUTPATIENT)
Dept: FAMILY MEDICINE CLINIC | Age: 65
End: 2020-07-23

## 2020-07-23 NOTE — TELEPHONE ENCOUNTER
AD[facility]ISE STAFF POOL. 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 HE IS HAVING A ROOT CANAL 7-28 FYI,HE WOULD LIKE TO KNOW IF YOU CAN GIVE HIM SOMETHING FOR THE CELLULITIS.     Health Maintenance   Topic Date Due    DTaP/Tdap/Td vaccine (1 - Tdap) 05/24/1972    Colon cancer screen colonoscopy  05/24/2003    Shingles Vaccine (1 of 2) 01/22/2021 (Originally 5/24/2003)    Pneumococcal 65+ years Vaccine (1 of 1 - PPSV23) 09/01/2021 (Originally 5/24/2018)    Flu vaccine (1) 09/01/2020    Lipid screen  02/20/2021    Potassium monitoring  02/20/2021    Creatinine monitoring  02/20/2021    Annual Wellness Visit (AWV)  05/31/2021    Hepatitis C screen  Completed    Hepatitis A vaccine  Aged Out    Hepatitis B vaccine  Aged Out    Hib vaccine  Aged Out    Meningococcal (ACWY) vaccine  Aged Out             (applicable per patient's age: Cancer Screenings, Depression Screening, Fall Risk Screening, Immunizations)    LDL Cholesterol (mg/dL)   Date Value   02/20/2020 74     AST (U/L)   Date Value   02/20/2020 30     ALT (U/L)   Date Value   02/20/2020 25     BUN (mg/dL)   Date Value   02/20/2020 33 (H)      (goal A1C is < 7)   (goal LDL is <100) need 30-50% reduction from baseline     BP Readings from Last 3 Encounters:   02/17/20 (!) 148/88   01/31/20 107/73   01/27/20 130/60    (goal /80)      All Future Testing planned in [facility]:  Lab Frequency Next Occurrence   EKG 12 lead Once 02/07/2020   Cologuard (For External Results Only) Once 05/30/2020       Next Visit Date:  Future Appointments   Date Time Provider Leydi Martinez   8/19/2020  1:00 PM [facility]            Patient Active Problem List:     Pancreatitis due to biliary obstruction     Atrial fibrillation (HCC)     Morbid obesity with BMI of 45.0-49.9, adult (HCC)     Chronic osteomyelitis involving lower leg (HCC)     H/O mitral valve replacement with tissue graft     Acute pancreatitis     Liver disease, alcoholic (HCC)     S/P cholecystectomy     Osteomyelitis of right lower extremity (HCC)     Hyponatremia     Hypokalemia     Leukocytosis     Hypoxemia     Hypertension     Acute cystitis with hematuria     Generalized weakness     Atrial fibrillation with rapid ventricular response (HCC)     Hypomagnesemia     Urinary tract infection without hematuria     Acute on chronic congestive heart failure (HCC)     Chronic systolic congestive heart failure, NYHA class 3 (HCC)     Mild concentric left ventricular hypertrophy (LVH)     Dilated cardiomyopathy (HCC)     Current use of long term anticoagulation     Hyperlipidemia     Long term current use of antiarrhythmic drug     Bilateral leg edema

## 2020-07-27 NOTE — TELEPHONE ENCOUNTER
Pt says his leg is doing a little better, he is using a circulation pump on it .   He is in the process of having a root canal done so he will call us Wednesday and let us know how it is going  And will schedule then

## 2020-07-30 ENCOUNTER — OFFICE VISIT (OUTPATIENT)
Dept: FAMILY MEDICINE CLINIC | Age: 65
End: 2020-07-30
Payer: MEDICARE

## 2020-07-30 VITALS
BODY MASS INDEX: 50.59 KG/M2 | HEART RATE: 64 BPM | TEMPERATURE: 99.2 F | OXYGEN SATURATION: 91 % | WEIGHT: 315 LBS | SYSTOLIC BLOOD PRESSURE: 128 MMHG | DIASTOLIC BLOOD PRESSURE: 72 MMHG

## 2020-07-30 PROCEDURE — 3017F COLORECTAL CA SCREEN DOC REV: CPT | Performed by: NURSE PRACTITIONER

## 2020-07-30 PROCEDURE — G8417 CALC BMI ABV UP PARAM F/U: HCPCS | Performed by: NURSE PRACTITIONER

## 2020-07-30 PROCEDURE — 99213 OFFICE O/P EST LOW 20 MIN: CPT | Performed by: NURSE PRACTITIONER

## 2020-07-30 PROCEDURE — 96372 THER/PROPH/DIAG INJ SC/IM: CPT | Performed by: NURSE PRACTITIONER

## 2020-07-30 PROCEDURE — G8427 DOCREV CUR MEDS BY ELIG CLIN: HCPCS | Performed by: NURSE PRACTITIONER

## 2020-07-30 PROCEDURE — 1036F TOBACCO NON-USER: CPT | Performed by: NURSE PRACTITIONER

## 2020-07-30 PROCEDURE — 4040F PNEUMOC VAC/ADMIN/RCVD: CPT | Performed by: NURSE PRACTITIONER

## 2020-07-30 PROCEDURE — 1123F ACP DISCUSS/DSCN MKR DOCD: CPT | Performed by: NURSE PRACTITIONER

## 2020-07-30 RX ORDER — CEFTRIAXONE 1 G/1
1 INJECTION, POWDER, FOR SOLUTION INTRAMUSCULAR; INTRAVENOUS ONCE
Status: COMPLETED | OUTPATIENT
Start: 2020-07-30 | End: 2020-07-30

## 2020-07-30 RX ORDER — CEPHALEXIN 500 MG/1
500 CAPSULE ORAL 3 TIMES DAILY
Qty: 21 CAPSULE | Refills: 0 | Status: SHIPPED | OUTPATIENT
Start: 2020-07-30 | End: 2021-01-22 | Stop reason: SDUPTHER

## 2020-07-30 RX ADMIN — CEFTRIAXONE 1 G: 1 INJECTION, POWDER, FOR SOLUTION INTRAMUSCULAR; INTRAVENOUS at 16:11

## 2020-07-30 SDOH — ECONOMIC STABILITY: FOOD INSECURITY: WITHIN THE PAST 12 MONTHS, THE FOOD YOU BOUGHT JUST DIDN'T LAST AND YOU DIDN'T HAVE MONEY TO GET MORE.: NEVER TRUE

## 2020-07-30 SDOH — ECONOMIC STABILITY: INCOME INSECURITY: HOW HARD IS IT FOR YOU TO PAY FOR THE VERY BASICS LIKE FOOD, HOUSING, MEDICAL CARE, AND HEATING?: NOT HARD AT ALL

## 2020-07-30 SDOH — ECONOMIC STABILITY: FOOD INSECURITY: WITHIN THE PAST 12 MONTHS, YOU WORRIED THAT YOUR FOOD WOULD RUN OUT BEFORE YOU GOT MONEY TO BUY MORE.: NEVER TRUE

## 2020-07-30 ASSESSMENT — PATIENT HEALTH QUESTIONNAIRE - PHQ9
SUM OF ALL RESPONSES TO PHQ QUESTIONS 1-9: 0
1. LITTLE INTEREST OR PLEASURE IN DOING THINGS: 0
2. FEELING DOWN, DEPRESSED OR HOPELESS: 0
SUM OF ALL RESPONSES TO PHQ9 QUESTIONS 1 & 2: 0
SUM OF ALL RESPONSES TO PHQ QUESTIONS 1-9: 0
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
## 2020-08-19 ENCOUNTER — TELEPHONE (OUTPATIENT)
Dept: PHARMACY | Age: 65
End: 2020-08-19

## 2020-08-19 NOTE — TELEPHONE ENCOUNTER
Patient called to cancel appt today. He is doing well on Eliquis and is following closely with his cardiologist.  Will close episode at this time - no further follow up needed.

## 2021-01-01 ENCOUNTER — TELEPHONE (OUTPATIENT)
Dept: INFECTIOUS DISEASES | Age: 66
End: 2021-01-01

## 2021-01-01 ENCOUNTER — TELEPHONE (OUTPATIENT)
Dept: FAMILY MEDICINE CLINIC | Age: 66
End: 2021-01-01

## 2021-01-01 RX ORDER — DOXYCYCLINE HYCLATE 100 MG
100 TABLET ORAL 2 TIMES DAILY
Qty: 20 TABLET | Refills: 0 | Status: SHIPPED | OUTPATIENT
Start: 2021-01-01 | End: 2022-01-01

## 2021-01-01 RX ORDER — LEVOFLOXACIN 500 MG/1
500 TABLET, FILM COATED ORAL DAILY
Qty: 10 TABLET | Refills: 0 | Status: SHIPPED | OUTPATIENT
Start: 2021-01-01 | End: 2022-01-01

## 2021-01-21 ENCOUNTER — TELEPHONE (OUTPATIENT)
Dept: FAMILY MEDICINE CLINIC | Age: 66
End: 2021-01-21

## 2021-01-21 DIAGNOSIS — L03.116 CELLULITIS OF LEFT LOWER EXTREMITY: ICD-10-CM

## 2021-01-22 RX ORDER — CEPHALEXIN 500 MG/1
500 CAPSULE ORAL 3 TIMES DAILY
Qty: 30 CAPSULE | Refills: 0 | Status: SHIPPED | OUTPATIENT
Start: 2021-01-22 | End: 2021-02-01 | Stop reason: SDUPTHER

## 2021-02-01 ENCOUNTER — OFFICE VISIT (OUTPATIENT)
Dept: FAMILY MEDICINE CLINIC | Age: 66
End: 2021-02-01
Payer: MEDICARE

## 2021-02-01 VITALS
OXYGEN SATURATION: 94 % | SYSTOLIC BLOOD PRESSURE: 112 MMHG | DIASTOLIC BLOOD PRESSURE: 64 MMHG | HEART RATE: 54 BPM | TEMPERATURE: 96.4 F | WEIGHT: 315 LBS | BODY MASS INDEX: 52.22 KG/M2

## 2021-02-01 DIAGNOSIS — E66.01 MORBID OBESITY WITH BMI OF 50.0-59.9, ADULT (HCC): ICD-10-CM

## 2021-02-01 DIAGNOSIS — Z91.81 AT HIGH RISK FOR FALLS: ICD-10-CM

## 2021-02-01 DIAGNOSIS — K70.9 LIVER DISEASE, ALCOHOLIC (HCC): ICD-10-CM

## 2021-02-01 DIAGNOSIS — I48.0 PAROXYSMAL ATRIAL FIBRILLATION (HCC): ICD-10-CM

## 2021-02-01 DIAGNOSIS — I42.0 DILATED CARDIOMYOPATHY (HCC): ICD-10-CM

## 2021-02-01 DIAGNOSIS — S81.801D OPEN WOUND OF RIGHT LOWER LEG, SUBSEQUENT ENCOUNTER: ICD-10-CM

## 2021-02-01 DIAGNOSIS — B86 SCABIES: ICD-10-CM

## 2021-02-01 DIAGNOSIS — L03.115 CELLULITIS OF RIGHT LOWER LEG: Primary | ICD-10-CM

## 2021-02-01 PROCEDURE — G8417 CALC BMI ABV UP PARAM F/U: HCPCS | Performed by: NURSE PRACTITIONER

## 2021-02-01 PROCEDURE — 99214 OFFICE O/P EST MOD 30 MIN: CPT | Performed by: NURSE PRACTITIONER

## 2021-02-01 PROCEDURE — 1036F TOBACCO NON-USER: CPT | Performed by: NURSE PRACTITIONER

## 2021-02-01 PROCEDURE — 3017F COLORECTAL CA SCREEN DOC REV: CPT | Performed by: NURSE PRACTITIONER

## 2021-02-01 PROCEDURE — 3288F FALL RISK ASSESSMENT DOCD: CPT | Performed by: NURSE PRACTITIONER

## 2021-02-01 PROCEDURE — G8484 FLU IMMUNIZE NO ADMIN: HCPCS | Performed by: NURSE PRACTITIONER

## 2021-02-01 PROCEDURE — 4040F PNEUMOC VAC/ADMIN/RCVD: CPT | Performed by: NURSE PRACTITIONER

## 2021-02-01 PROCEDURE — G8427 DOCREV CUR MEDS BY ELIG CLIN: HCPCS | Performed by: NURSE PRACTITIONER

## 2021-02-01 PROCEDURE — 1123F ACP DISCUSS/DSCN MKR DOCD: CPT | Performed by: NURSE PRACTITIONER

## 2021-02-01 RX ORDER — PERMETHRIN 50 MG/G
CREAM TOPICAL
Qty: 60 G | Refills: 3 | Status: SHIPPED | OUTPATIENT
Start: 2021-02-01 | End: 2021-05-12 | Stop reason: ALTCHOICE

## 2021-02-01 RX ORDER — CEPHALEXIN 500 MG/1
500 CAPSULE ORAL 3 TIMES DAILY
Qty: 30 CAPSULE | Refills: 0 | Status: SHIPPED | OUTPATIENT
Start: 2021-02-01 | End: 2021-02-11

## 2021-02-01 ASSESSMENT — PATIENT HEALTH QUESTIONNAIRE - PHQ9
1. LITTLE INTEREST OR PLEASURE IN DOING THINGS: 0
SUM OF ALL RESPONSES TO PHQ QUESTIONS 1-9: 0
SUM OF ALL RESPONSES TO PHQ QUESTIONS 1-9: 0
2. FEELING DOWN, DEPRESSED OR HOPELESS: 0
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
## 2021-04-30 ENCOUNTER — TELEPHONE (OUTPATIENT)
Dept: FAMILY MEDICINE CLINIC | Age: 66
End: 2021-04-30

## 2021-04-30 DIAGNOSIS — L03.115 CELLULITIS OF RIGHT LOWER LEG: Primary | ICD-10-CM

## 2021-04-30 DIAGNOSIS — S81.801D OPEN WOUND OF RIGHT LOWER LEG, SUBSEQUENT ENCOUNTER: ICD-10-CM

## 2021-05-03 NOTE — TELEPHONE ENCOUNTER
Please advise Mr. Chad Martínez that I have referred him to infectious disease for further evaluation and treatment. I do not feel that it is appropriate to continue treating this blindly.

## 2021-05-12 ENCOUNTER — OFFICE VISIT (OUTPATIENT)
Dept: INFECTIOUS DISEASES | Age: 66
End: 2021-05-12
Payer: MEDICARE

## 2021-05-12 VITALS
BODY MASS INDEX: 42.66 KG/M2 | HEIGHT: 72 IN | HEART RATE: 61 BPM | OXYGEN SATURATION: 98 % | TEMPERATURE: 98.9 F | SYSTOLIC BLOOD PRESSURE: 107 MMHG | RESPIRATION RATE: 20 BRPM | WEIGHT: 315 LBS | DIASTOLIC BLOOD PRESSURE: 64 MMHG

## 2021-05-12 DIAGNOSIS — M86.661 CHRONIC OSTEOMYELITIS OF RIGHT LOWER LEG (HCC): Primary | ICD-10-CM

## 2021-05-12 DIAGNOSIS — T81.89XA NON-HEALING SURGICAL WOUND, INITIAL ENCOUNTER: ICD-10-CM

## 2021-05-12 DIAGNOSIS — L03.90 CELLULITIS, UNSPECIFIED CELLULITIS SITE: ICD-10-CM

## 2021-05-12 PROCEDURE — 99203 OFFICE O/P NEW LOW 30 MIN: CPT | Performed by: INTERNAL MEDICINE

## 2021-05-12 PROCEDURE — G8427 DOCREV CUR MEDS BY ELIG CLIN: HCPCS | Performed by: INTERNAL MEDICINE

## 2021-05-12 PROCEDURE — 4040F PNEUMOC VAC/ADMIN/RCVD: CPT | Performed by: INTERNAL MEDICINE

## 2021-05-12 PROCEDURE — 1036F TOBACCO NON-USER: CPT | Performed by: INTERNAL MEDICINE

## 2021-05-12 PROCEDURE — G8417 CALC BMI ABV UP PARAM F/U: HCPCS | Performed by: INTERNAL MEDICINE

## 2021-05-12 PROCEDURE — 3017F COLORECTAL CA SCREEN DOC REV: CPT | Performed by: INTERNAL MEDICINE

## 2021-05-12 PROCEDURE — 1123F ACP DISCUSS/DSCN MKR DOCD: CPT | Performed by: INTERNAL MEDICINE

## 2021-05-12 RX ORDER — CIPROFLOXACIN 500 MG/1
500 TABLET, FILM COATED ORAL 2 TIMES DAILY
Qty: 28 TABLET | Refills: 0 | Status: SHIPPED | OUTPATIENT
Start: 2021-05-12 | End: 2021-05-26

## 2021-05-12 RX ORDER — CLINDAMYCIN HYDROCHLORIDE 150 MG/1
CAPSULE ORAL
COMMUNITY
Start: 2021-03-02 | End: 2021-05-12 | Stop reason: ALTCHOICE

## 2021-05-12 RX ORDER — DOXYCYCLINE HYCLATE 100 MG
100 TABLET ORAL 2 TIMES DAILY
Qty: 56 TABLET | Refills: 0 | Status: SHIPPED | OUTPATIENT
Start: 2021-05-12 | End: 2021-06-09

## 2021-05-12 ASSESSMENT — ENCOUNTER SYMPTOMS
ALLERGIC/IMMUNOLOGIC NEGATIVE: 1
GASTROINTESTINAL NEGATIVE: 1
RESPIRATORY NEGATIVE: 1
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
## 2021-06-16 DIAGNOSIS — M10.9 GOUT, UNSPECIFIED CAUSE, UNSPECIFIED CHRONICITY, UNSPECIFIED SITE: ICD-10-CM

## 2021-06-16 RX ORDER — ALLOPURINOL 100 MG/1
100 TABLET ORAL DAILY
Qty: 30 TABLET | Refills: 2 | Status: SHIPPED | OUTPATIENT
Start: 2021-06-16 | End: 2021-06-21 | Stop reason: SDUPTHER

## 2021-06-16 NOTE — TELEPHONE ENCOUNTER
lAex Rushing is calling to request a refill on the following medication(s):    Medication Request:  Requested Prescriptions     Pending Prescriptions Disp Refills    allopurinol (ZYLOPRIM) 100 MG tablet 30 tablet 11     Sig: Take 1 tablet by mouth daily       Last Visit Date (If Applicable):  0/7/0509 In office    Next Visit Date:    Visit date not found

## 2021-06-18 DIAGNOSIS — M10.9 GOUT, UNSPECIFIED CAUSE, UNSPECIFIED CHRONICITY, UNSPECIFIED SITE: ICD-10-CM

## 2021-06-18 NOTE — TELEPHONE ENCOUNTER
Drake Ramos is calling to request a refill on the following medication(s):    Medication Request:  Requested Prescriptions     Pending Prescriptions Disp Refills    allopurinol (ZYLOPRIM) 100 MG tablet 30 tablet 2     Sig: Take 1 tablet by mouth daily       Last Visit Date (If Applicable):  3/2/0294    Next Visit Date:    Visit date not found

## 2021-06-21 RX ORDER — ALLOPURINOL 100 MG/1
100 TABLET ORAL DAILY
Qty: 30 TABLET | Refills: 2 | Status: SHIPPED | OUTPATIENT
Start: 2021-06-21 | End: 2022-01-01 | Stop reason: ALTCHOICE

## 2021-06-23 ENCOUNTER — OFFICE VISIT (OUTPATIENT)
Dept: INFECTIOUS DISEASES | Age: 66
End: 2021-06-23
Payer: MEDICARE

## 2021-06-23 VITALS
HEART RATE: 63 BPM | DIASTOLIC BLOOD PRESSURE: 57 MMHG | BODY MASS INDEX: 42.66 KG/M2 | HEIGHT: 72 IN | WEIGHT: 315 LBS | SYSTOLIC BLOOD PRESSURE: 96 MMHG | OXYGEN SATURATION: 98 % | TEMPERATURE: 98.1 F | RESPIRATION RATE: 18 BRPM

## 2021-06-23 DIAGNOSIS — M86.661 CHRONIC OSTEOMYELITIS OF RIGHT LOWER LEG (HCC): Primary | ICD-10-CM

## 2021-06-23 DIAGNOSIS — I87.2 VENOUS STASIS DERMATITIS OF BOTH LOWER EXTREMITIES: ICD-10-CM

## 2021-06-23 DIAGNOSIS — T81.89XD NON-HEALING SURGICAL WOUND, SUBSEQUENT ENCOUNTER: ICD-10-CM

## 2021-06-23 PROCEDURE — 99213 OFFICE O/P EST LOW 20 MIN: CPT | Performed by: INTERNAL MEDICINE

## 2021-06-23 PROCEDURE — 1123F ACP DISCUSS/DSCN MKR DOCD: CPT | Performed by: INTERNAL MEDICINE

## 2021-06-23 PROCEDURE — 4040F PNEUMOC VAC/ADMIN/RCVD: CPT | Performed by: INTERNAL MEDICINE

## 2021-06-23 PROCEDURE — G8427 DOCREV CUR MEDS BY ELIG CLIN: HCPCS | Performed by: INTERNAL MEDICINE

## 2021-06-23 PROCEDURE — 3017F COLORECTAL CA SCREEN DOC REV: CPT | Performed by: INTERNAL MEDICINE

## 2021-06-23 PROCEDURE — 1036F TOBACCO NON-USER: CPT | Performed by: INTERNAL MEDICINE

## 2021-06-23 PROCEDURE — G8417 CALC BMI ABV UP PARAM F/U: HCPCS | Performed by: INTERNAL MEDICINE

## 2021-06-23 NOTE — PROGRESS NOTES
no chest pains or palpitations. I have personally reviewedthe past medical history, medications, social history, and I have updated the database accordingly. Past Medical History:     Past Medical History:   Diagnosis Date    Arrhythmia     At[address] [facility] ([facility]     Cardiomyopathy ([facility]     valvular     Facial droop     since birth    Sandi Sidhu Gout     Hyperlipidemia     Hypertension     Leg wound, right     ? osteomyelitis    [facility]      Medications:     Current Outpatient Medications   Medication Sig Dispense Refill    allopurinol (ZYLOPRIM) 100 MG tablet Take 1 tablet by mouth daily 30 tablet 2    furosemide (LASIX) 40 MG tablet TAKE 1 TABLET BY MOUTH DAILY 90 tablet 3    potassium chloride (KLOR-CON M) 20 MEQ extended release tablet TAKE 1 TABLET BY MOUTH TWICE DAILY WITH FOOD 60 tablet 5    apixaban (ELIQUIS) 5 MG TABS tablet Take 1 tablet by mouth 2 times daily 60 tablet 3    colchicine (COLCRYS) 0.6 MG tablet TAKE 1 TABLET BY MOUTH DAILY AS NEEDED FOR GOUT FLARE 30 tablet 11    losartan (COZAAR) 50 MG tablet TAKE 1 TABLET BY MOUTH DAILY 30 tablet 5    pravastatin (PRAVACHOL) 20 MG tablet TAKE 1 TABLET BY MOUTH DAILY 30 tablet 11    sotalol (BETAPACE) 80 MG tablet Take 1 tablet by mouth 2 times daily (Patient taking differently: Take 120 mg by mouth 2 times daily ) 60 tablet 5    digoxin (LANOXIN) 125 MCG tablet Take 1 tablet by mouth daily 30 tablet 11    aspirin 81 MG tablet Take 1 tablet by mouth daily 30 tablet 0     No current facility-administered medications for this visit. Allergies:   Bumetanide and Vicodin [hydrocodone-acetaminophen]     Review of Systems:   Review of Systems   Constitutional: Negative. Respiratory: Negative. Cardiovascular: Negative. Gastrointestinal: Negative. Genitourinary: Negative. Musculoskeletal: Negative. Skin: Positive for wound. Allergic/Immunologic: Negative. Neurological: Negative. Physical Examination :   BP (!) 96/57 (Site: Left Upper Arm, Position: Sitting, Cuff Size: Large Adult)   Pulse 63   Temp 98.1 °F (36.7 °C) (Temporal)   Resp 18   Ht 6' (1.829 m)   Wt (!) 376 lb 6.4 oz (170.7 kg)   SpO2 98%   BMI 51.05 kg/m²     Physical Exam  Constitutional:       Appearance: He is well-developed. He is obese. HENT:      Head: Normocephalic and atraumatic. Cardiovascular:      Rate and Rhythm: Normal rate. Heart sounds: Normal heart sounds. No friction rub. No gallop. Pulmonary:      Effort: Pulmonary effort is normal.      Breath sounds: Normal breath sounds. No wheezing. Abdominal:      General: Bowel sounds are normal.      Palpations: Abdomen is soft. There is no mass. Tenderness: There is no abdominal tenderness. Hernia: A hernia is present. Musculoskeletal:      Cervical back: Normal range of motion and neck supple. Lymphadenopathy:      Cervical: No cervical adenopathy. Skin:     Comments: There is a right anterior leg wound as depicted in the picture below. There are some surrounding dermatitis. Neurological:      Mental Status: He is alert and oriented to person, place, and time.                  Laboratory studies :  Medical Decision Making:   I have independently reviewed the following labs:  CBC with Differential:  Lab Results   Component Value Date    WBC 9.3 02/20/2020    WBC 8.4 05/24/2019    HGB 14.5 02/20/2020    HGB 13.6 05/24/2019    HCT 45.9 02/20/2020    HCT 42.2 05/24/2019     02/20/2020     05/24/2019    LYMPHOPCT 2 02/10/2019    LYMPHOPCT 7 01/16/2016    MONOPCT 2 02/10/2019    MONOPCT 7 01/16/2016       BMP:  Lab Results   Component Value Date     02/20/2020     01/31/2020    K 4.6 02/20/2020    K 5.2 01/31/2020    CL 98 02/20/2020    CL 96 01/31/2020    CO2 26 02/20/2020    CO2 29 01/31/2020    BUN 33 02/20/2020    BUN 18 09/03/2019    CREATININE 1.27 02/20/2020    CREATININE 0.90 09/03/2019    MG 1.7 02/12/2019    MG 1.7 02/11/2019       Hepatic Function Panel:   Lab Results   Component Value Date    PROT 8.1 02/20/2020    PROT 7.8 05/24/2019    LABALBU 3.9 02/20/2020    LABALBU 3.9 05/24/2019    BILIDIR 1.01 02/11/2019    BILIDIR 0.47 01/18/2016    IBILI 0.98 02/11/2019    IBILI 0.66 01/18/2016    BILITOT 0.60 02/20/2020    BILITOT 1.56 05/24/2019    ALKPHOS 102 02/20/2020    ALKPHOS 84 05/24/2019    ALT 25 02/20/2020    ALT 10 05/24/2019    AST 30 02/20/2020    AST 17 05/24/2019       No results found for: CRP  No results found for: SEDRATE      Thank you for allowing us to participate in the care of this patient. Pleasecall with questions. Dru Siegel MD  [employer] messaging: [phone]    This note is created with the assistance of a speech recognition program.  While intending to generate a document that actually reflects the content ofthe visit, the document can still have some errors including those of syntax and sound a like substitutions which may escape proof reading. It such instances, actual meaning can be extrapolated by contextual diversion.

## 2021-06-24 ASSESSMENT — ENCOUNTER SYMPTOMS
RESPIRATORY NEGATIVE: 1
GASTROINTESTINAL NEGATIVE: 1
ALLERGIC/IMMUNOLOGIC NEGATIVE: 1

## 2021-08-26 ENCOUNTER — TELEPHONE (OUTPATIENT)
Dept: INFECTIOUS DISEASES | Age: 66
End: 2021-08-26

## 2021-08-26 RX ORDER — LEVOFLOXACIN 500 MG/1
500 TABLET, FILM COATED ORAL DAILY
Qty: 10 TABLET | Refills: 0 | Status: SHIPPED | OUTPATIENT
Start: 2021-08-26 | End: 2021-09-05

## 2021-08-26 RX ORDER — DOXYCYCLINE HYCLATE 100 MG
100 TABLET ORAL 2 TIMES DAILY
Qty: 20 TABLET | Refills: 0 | Status: SHIPPED | OUTPATIENT
Start: 2021-08-26 | End: 2021-09-05

## 2021-08-26 NOTE — TELEPHONE ENCOUNTER
Pt last seen in June - was asked to call if needed. Pts leg has been hurting around his wound, he believes infection is back. Wound overall doesn't look bad. Do you want to see pt, or refill medication? No symptoms/signs of infection other than the pain. I have called in prescription for Levaquin and doxycycline for 10 days.   If the symptoms do not resolve after this then he will need to call the office for an appointment can be virtual or in person    Jenelle Sutton MD

## 2021-09-02 DIAGNOSIS — M10.9 GOUT, UNSPECIFIED CAUSE, UNSPECIFIED CHRONICITY, UNSPECIFIED SITE: ICD-10-CM

## 2021-09-02 RX ORDER — COLCHICINE 0.6 MG/1
TABLET ORAL
Qty: 30 TABLET | Refills: 5 | Status: SHIPPED | OUTPATIENT
Start: 2021-09-02

## 2021-09-02 NOTE — TELEPHONE ENCOUNTER
Dr Medina Silver no longer orders this        Ceci Chatman is calling to request a refill on the following medication(s):    Medication Request:  Requested Prescriptions     Pending Prescriptions Disp Refills    colchicine (COLCRYS) 0.6 MG tablet 30 tablet 5     Sig: TAKE 1 TABLET BY MOUTH DAILY AS NEEDED FOR GOUT FLARE       Last Visit Date (If Applicable):  1/4/1497    Next Visit Date:    Visit date not found

## 2021-12-22 NOTE — TELEPHONE ENCOUNTER
Patient called, scheduled a follow up appt appt, chose the end of January. He is also asking for an antibiotic for his right lower leg. Has a non-healing open wound that for the last few days has been achey, is burning and hurts with movement. You last sent in Levofloxacin and Doxy and he said that worked for him. I pended those 2 scripts. Please sign off on them if you agree.

## 2022-01-01 ENCOUNTER — OFFICE VISIT (OUTPATIENT)
Dept: INFECTIOUS DISEASES | Age: 67
End: 2022-01-01
Payer: MEDICARE

## 2022-01-01 ENCOUNTER — CARE COORDINATION (OUTPATIENT)
Dept: CARE COORDINATION | Age: 67
End: 2022-01-01

## 2022-01-01 ENCOUNTER — HOSPITAL ENCOUNTER (OUTPATIENT)
Dept: WOUND CARE | Age: 67
Discharge: HOME | End: 2022-03-16
Payer: MEDICARE

## 2022-01-01 ENCOUNTER — HOSPITAL ENCOUNTER (EMERGENCY)
Age: 67
Discharge: HOME | End: 2022-01-03
Attending: EMERGENCY MEDICINE
Payer: MEDICARE

## 2022-01-01 ENCOUNTER — OFFICE VISIT (OUTPATIENT)
Dept: ORTHOPEDIC SURGERY | Age: 67
End: 2022-01-01
Payer: MEDICARE

## 2022-01-01 ENCOUNTER — APPOINTMENT (OUTPATIENT)
Dept: GENERAL RADIOLOGY | Age: 67
End: 2022-01-01
Payer: MEDICARE

## 2022-01-01 ENCOUNTER — SCHEDULED TELEPHONE ENCOUNTER (OUTPATIENT)
Dept: INFECTIOUS DISEASES | Age: 67
End: 2022-01-01
Payer: MEDICARE

## 2022-01-01 ENCOUNTER — TELEMEDICINE (OUTPATIENT)
Dept: INFECTIOUS DISEASES | Age: 67
End: 2022-01-01
Payer: MEDICARE

## 2022-01-01 ENCOUNTER — TELEPHONE (OUTPATIENT)
Dept: FAMILY MEDICINE CLINIC | Age: 67
End: 2022-01-01

## 2022-01-01 ENCOUNTER — HOSPITAL ENCOUNTER (OUTPATIENT)
Age: 67
Setting detail: SPECIMEN
Discharge: HOME | End: 2022-01-27

## 2022-01-01 VITALS
RESPIRATION RATE: 18 BRPM | OXYGEN SATURATION: 97 % | HEART RATE: 48 BPM | HEIGHT: 72 IN | BODY MASS INDEX: 42.66 KG/M2 | DIASTOLIC BLOOD PRESSURE: 62 MMHG | WEIGHT: 315 LBS | SYSTOLIC BLOOD PRESSURE: 98 MMHG | TEMPERATURE: 98 F

## 2022-01-01 VITALS
SYSTOLIC BLOOD PRESSURE: 116 MMHG | TEMPERATURE: 97.8 F | HEIGHT: 72 IN | WEIGHT: 315 LBS | HEART RATE: 52 BPM | OXYGEN SATURATION: 95 % | BODY MASS INDEX: 42.66 KG/M2 | RESPIRATION RATE: 18 BRPM | DIASTOLIC BLOOD PRESSURE: 64 MMHG

## 2022-01-01 VITALS — RESPIRATION RATE: 16 BRPM | BODY MASS INDEX: 42.66 KG/M2 | WEIGHT: 315 LBS | HEIGHT: 72 IN

## 2022-01-01 VITALS
DIASTOLIC BLOOD PRESSURE: 59 MMHG | BODY MASS INDEX: 42.66 KG/M2 | WEIGHT: 315 LBS | HEART RATE: 59 BPM | HEIGHT: 72 IN | SYSTOLIC BLOOD PRESSURE: 133 MMHG | TEMPERATURE: 98.4 F

## 2022-01-01 DIAGNOSIS — I87.2 PERIPHERAL VENOUS INSUFFICIENCY: ICD-10-CM

## 2022-01-01 DIAGNOSIS — M86.661 CHRONIC OSTEOMYELITIS OF RIGHT LOWER LEG (HCC): Primary | ICD-10-CM

## 2022-01-01 DIAGNOSIS — G89.29 CHRONIC PAIN OF RIGHT ANKLE: Primary | ICD-10-CM

## 2022-01-01 DIAGNOSIS — M86.661 CHRONIC OSTEOMYELITIS OF RIGHT LOWER LEG (HCC): ICD-10-CM

## 2022-01-01 DIAGNOSIS — M86.461 CHRONIC OSTEOMYELITIS OF RIGHT TIBIA WITH DRAINING SINUS (HCC): Primary | ICD-10-CM

## 2022-01-01 DIAGNOSIS — M25.571 CHRONIC PAIN OF RIGHT ANKLE: Primary | ICD-10-CM

## 2022-01-01 DIAGNOSIS — L03.90 CELLULITIS, UNSPECIFIED CELLULITIS SITE: ICD-10-CM

## 2022-01-01 DIAGNOSIS — T81.89XD NON-HEALING SURGICAL WOUND, SUBSEQUENT ENCOUNTER: Primary | ICD-10-CM

## 2022-01-01 DIAGNOSIS — M86.471 CHRONIC OSTEOMYELITIS OF RIGHT ANKLE WITH DRAINING SINUS (HCC): Primary | ICD-10-CM

## 2022-01-01 DIAGNOSIS — S81.801D WOUND OF RIGHT LOWER EXTREMITY, SUBSEQUENT ENCOUNTER: ICD-10-CM

## 2022-01-01 DIAGNOSIS — L97.914 NON-PRESSURE CHRONIC ULCER OF RIGHT LOWER LEG WITH NECROSIS OF BONE (HCC): ICD-10-CM

## 2022-01-01 DIAGNOSIS — T81.89XD NON-HEALING SURGICAL WOUND, SUBSEQUENT ENCOUNTER: ICD-10-CM

## 2022-01-01 DIAGNOSIS — I87.2 VENOUS STASIS DERMATITIS OF BOTH LOWER EXTREMITIES: ICD-10-CM

## 2022-01-01 DIAGNOSIS — M19.271 SECONDARY LOCALIZED OSTEOARTHROSIS OF RIGHT ANKLE AND FOOT: ICD-10-CM

## 2022-01-01 DIAGNOSIS — Z20.822 SUSPECTED COVID-19 VIRUS INFECTION: Primary | ICD-10-CM

## 2022-01-01 LAB
CULTURE: ABNORMAL
DIRECT EXAM: ABNORMAL
Lab: ABNORMAL
SARS-COV-2: ABNORMAL
SARS-COV-2: DETECTED
SOURCE: ABNORMAL
SPECIMEN DESCRIPTION: ABNORMAL

## 2022-01-01 PROCEDURE — 1123F ACP DISCUSS/DSCN MKR DOCD: CPT | Performed by: ORTHOPAEDIC SURGERY

## 2022-01-01 PROCEDURE — G8427 DOCREV CUR MEDS BY ELIG CLIN: HCPCS | Performed by: ORTHOPAEDIC SURGERY

## 2022-01-01 PROCEDURE — 99442 PR PHYS/QHP TELEPHONE EVALUATION 11-20 MIN: CPT | Performed by: INTERNAL MEDICINE

## 2022-01-01 PROCEDURE — U0003 INFECTIOUS AGENT DETECTION BY NUCLEIC ACID (DNA OR RNA); SEVERE ACUTE RESPIRATORY SYNDROME CORONAVIRUS 2 (SARS-COV-2) (CORONAVIRUS DISEASE [COVID-19]), AMPLIFIED PROBE TECHNIQUE, MAKING USE OF HIGH THROUGHPUT TECHNOLOGIES AS DESCRIBED BY CMS-2020-01-R: HCPCS

## 2022-01-01 PROCEDURE — G8427 DOCREV CUR MEDS BY ELIG CLIN: HCPCS | Performed by: INTERNAL MEDICINE

## 2022-01-01 PROCEDURE — 71045 X-RAY EXAM CHEST 1 VIEW: CPT

## 2022-01-01 PROCEDURE — G8417 CALC BMI ABV UP PARAM F/U: HCPCS | Performed by: INTERNAL MEDICINE

## 2022-01-01 PROCEDURE — 3017F COLORECTAL CA SCREEN DOC REV: CPT | Performed by: INTERNAL MEDICINE

## 2022-01-01 PROCEDURE — 4040F PNEUMOC VAC/ADMIN/RCVD: CPT | Performed by: ORTHOPAEDIC SURGERY

## 2022-01-01 PROCEDURE — 99283 EMERGENCY DEPT VISIT LOW MDM: CPT

## 2022-01-01 PROCEDURE — 3017F COLORECTAL CA SCREEN DOC REV: CPT | Performed by: ORTHOPAEDIC SURGERY

## 2022-01-01 PROCEDURE — G8484 FLU IMMUNIZE NO ADMIN: HCPCS | Performed by: INTERNAL MEDICINE

## 2022-01-01 PROCEDURE — 4040F PNEUMOC VAC/ADMIN/RCVD: CPT | Performed by: INTERNAL MEDICINE

## 2022-01-01 PROCEDURE — G8417 CALC BMI ABV UP PARAM F/U: HCPCS | Performed by: ORTHOPAEDIC SURGERY

## 2022-01-01 PROCEDURE — 1123F ACP DISCUSS/DSCN MKR DOCD: CPT | Performed by: INTERNAL MEDICINE

## 2022-01-01 PROCEDURE — 1036F TOBACCO NON-USER: CPT | Performed by: INTERNAL MEDICINE

## 2022-01-01 PROCEDURE — G8484 FLU IMMUNIZE NO ADMIN: HCPCS | Performed by: ORTHOPAEDIC SURGERY

## 2022-01-01 PROCEDURE — 1036F TOBACCO NON-USER: CPT | Performed by: ORTHOPAEDIC SURGERY

## 2022-01-01 PROCEDURE — U0005 INFEC AGEN DETEC AMPLI PROBE: HCPCS

## 2022-01-01 PROCEDURE — 99213 OFFICE O/P EST LOW 20 MIN: CPT | Performed by: INTERNAL MEDICINE

## 2022-01-01 PROCEDURE — 99213 OFFICE O/P EST LOW 20 MIN: CPT

## 2022-01-01 PROCEDURE — 99441 PR PHYS/QHP TELEPHONE EVALUATION 5-10 MIN: CPT | Performed by: INTERNAL MEDICINE

## 2022-01-01 PROCEDURE — 11044 DBRDMT BONE 1ST 20 SQ CM/<: CPT

## 2022-01-01 PROCEDURE — 99204 OFFICE O/P NEW MOD 45 MIN: CPT | Performed by: ORTHOPAEDIC SURGERY

## 2022-01-01 RX ORDER — LIDOCAINE 50 MG/G
OINTMENT TOPICAL ONCE
Status: CANCELLED | OUTPATIENT
Start: 2022-01-01 | End: 2022-01-01

## 2022-01-01 RX ORDER — BETAMETHASONE DIPROPIONATE 0.05 %
OINTMENT (GRAM) TOPICAL ONCE
Status: CANCELLED | OUTPATIENT
Start: 2022-01-01 | End: 2022-01-01

## 2022-01-01 RX ORDER — SODIUM HYPOCHLORITE 1.25 MG/ML
SOLUTION TOPICAL
Qty: 1000 ML | Refills: 3 | Status: SHIPPED | OUTPATIENT
Start: 2022-01-01

## 2022-01-01 RX ORDER — COLCHICINE 0.6 MG/1
TABLET ORAL
COMMUNITY
Start: 2021-05-27

## 2022-01-01 RX ORDER — GINSENG 100 MG
CAPSULE ORAL ONCE
Status: CANCELLED | OUTPATIENT
Start: 2022-01-01 | End: 2022-01-01

## 2022-01-01 RX ORDER — LIDOCAINE HYDROCHLORIDE 20 MG/ML
JELLY TOPICAL ONCE
Status: CANCELLED | OUTPATIENT
Start: 2022-01-01 | End: 2022-01-01

## 2022-01-01 RX ORDER — BACITRACIN ZINC AND POLYMYXIN B SULFATE 500; 1000 [USP'U]/G; [USP'U]/G
OINTMENT TOPICAL ONCE
Status: CANCELLED | OUTPATIENT
Start: 2022-01-01 | End: 2022-01-01

## 2022-01-01 RX ORDER — SOTALOL HYDROCHLORIDE 120 MG/1
TABLET ORAL
COMMUNITY
Start: 2022-01-01 | End: 2022-01-01 | Stop reason: ALTCHOICE

## 2022-01-01 RX ORDER — SULFAMETHOXAZOLE AND TRIMETHOPRIM 800; 160 MG/1; MG/1
1 TABLET ORAL 2 TIMES DAILY
Qty: 28 TABLET | Refills: 0 | Status: SHIPPED | OUTPATIENT
Start: 2022-01-01 | End: 2022-01-01

## 2022-01-01 RX ORDER — BENZONATATE 100 MG/1
100 CAPSULE ORAL 3 TIMES DAILY PRN
Qty: 30 CAPSULE | Refills: 0 | Status: SHIPPED | OUTPATIENT
Start: 2022-01-01 | End: 2022-01-01

## 2022-01-01 RX ORDER — PREDNISONE 50 MG/1
50 TABLET ORAL DAILY
Qty: 5 TABLET | Refills: 0 | Status: SHIPPED | OUTPATIENT
Start: 2022-01-01 | End: 2022-01-01

## 2022-01-01 RX ORDER — LIDOCAINE HYDROCHLORIDE 40 MG/ML
SOLUTION TOPICAL ONCE
Status: CANCELLED | OUTPATIENT
Start: 2022-01-01 | End: 2022-01-01

## 2022-01-01 RX ORDER — BACITRACIN, NEOMYCIN, POLYMYXIN B 400; 3.5; 5 [USP'U]/G; MG/G; [USP'U]/G
OINTMENT TOPICAL ONCE
Status: CANCELLED | OUTPATIENT
Start: 2022-01-01 | End: 2022-01-01

## 2022-01-01 RX ORDER — CLOBETASOL PROPIONATE 0.5 MG/G
OINTMENT TOPICAL ONCE
Status: CANCELLED | OUTPATIENT
Start: 2022-01-01 | End: 2022-01-01

## 2022-01-01 RX ORDER — GENTAMICIN SULFATE 1 MG/G
OINTMENT TOPICAL ONCE
Status: CANCELLED | OUTPATIENT
Start: 2022-01-01 | End: 2022-01-01

## 2022-01-01 RX ORDER — LOSARTAN POTASSIUM 50 MG/1
50 TABLET ORAL DAILY
COMMUNITY
Start: 2021-06-08 | End: 2022-01-01 | Stop reason: ALTCHOICE

## 2022-01-01 RX ORDER — SODIUM HYPOCHLORITE 1.25 MG/ML
SOLUTION TOPICAL DAILY
Qty: 1000 ML | Refills: 0 | Status: SHIPPED | OUTPATIENT
Start: 2022-01-01 | End: 2022-01-01

## 2022-01-01 RX ORDER — LIDOCAINE 40 MG/G
CREAM TOPICAL ONCE
Status: CANCELLED | OUTPATIENT
Start: 2022-01-01 | End: 2022-01-01

## 2022-01-01 ASSESSMENT — ENCOUNTER SYMPTOMS
COLOR CHANGE: 0
RHINORRHEA: 0
VOMITING: 0
EYE DISCHARGE: 0
NAUSEA: 0
RESPIRATORY NEGATIVE: 1
EYE REDNESS: 0
ALLERGIC/IMMUNOLOGIC NEGATIVE: 1
SORE THROAT: 1
GASTROINTESTINAL NEGATIVE: 1
DIARRHEA: 0
COUGH: 1
SHORTNESS OF BREATH: 0

## 2022-01-01 ASSESSMENT — PAIN SCALES - GENERAL: PAINLEVEL_OUTOF10: 5

## 2022-01-01 ASSESSMENT — PATIENT HEALTH QUESTIONNAIRE - PHQ9
SUM OF ALL RESPONSES TO PHQ QUESTIONS 1-9: 2
SUM OF ALL RESPONSES TO PHQ9 QUESTIONS 1 & 2: 2
2. FEELING DOWN, DEPRESSED OR HOPELESS: 1
SUM OF ALL RESPONSES TO PHQ QUESTIONS 1-9: 2
1. LITTLE INTEREST OR PLEASURE IN DOING THINGS: 1

## 2022-01-03 NOTE — ED NOTES
Patient comes in with ha and fever for two days. Patient is alert and oriented x4 and denies any SOB.        Joellen Castorena RN  01/03/22 [address] Ha Perez RN  01/03/22 [id number]

## 2022-01-06 NOTE — TELEPHONE ENCOUNTER
----- Message from Julito Garcia sent at 1/5/2022 12:21 PM EST -----  Subject: Appointment Request    Reason for Call: Urgent (Patient Request) ED Follow Up Visit    QUESTIONS  Type of Appointment? Established Patient  Reason for appointment request? Available appointments did not meet   patient need  Additional Information for Provider? Patient is requesting a ED follow up   for covid. He would like someone from the office to call. Please advise. He has no fever. He has a cough, runny nose and brother is bringing a   breathing machine but he states he can breath fine. He is getting his   appetite back. Been sick for about a week now.  ---------------------------------------------------------------------------  --------------  CALL BACK INFO  What is the best way for the office to contact you? OK to leave message on   voicemail  Preferred Call Back Phone Number? [phone]  ---------------------------------------------------------------------------  --------------  SCRIPT ANSWERS  Relationship to Patient? Self  (Patient requests to see provider urgently. )? Yes  Do you have any questions for your primary care provider that need to be   answered prior to your appointment? No  Have you been diagnosed with, awaiting test results for, or told that you   are suspected of having COVID-19 (Coronavirus)? (If patient has tested   negative or was tested as a requirement for work, school, or travel and   not based on symptoms, answer no)? Yes  Did your symptoms begin within the past 14 days or was your positive test   result within the past 14 days?  Yes
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
## 2022-02-17 NOTE — LETTER
[facility] [address] [place] [id number]  Phone: [phone]  Fax: [phone]    Kehinde Soria MD    March 8, 2022     Merry Santiago, APRN - CNP  [address] [facility] [address] [address]    Patient: Andria Frank   MR Number: [id number]   YOB: 1955   Date of Visit: 2/17/2022       Dear Merry Santiago: Thank you for referring Drew Port to me for evaluation/treatment. Below are the relevant portions of my assessment and plan of care. If you have questions, please do not hesitate to call me. I look forward to following Marcos Carrasco along with you.     Sincerely,      Kehinde Soria MD

## 2022-02-17 NOTE — PROGRESS NOTES
Dana Nelson is a 74 y.o. male evaluated via telephone on 2/17/2022. Consent:  He and/or health care decision maker is aware that that he may receive a bill for this telephone service, which includes applicable co-pays, depending on his insurance coverage, and has provided verbal consent to proceed. Documentation:  I communicated with the patient and/or health care decision maker about the right lower extremity wound and osteomyelitis. Details of this discussion including any medical advice provided:     Ellis Davila has a longstanding history of a nonhealing surgical wound in the right lower extremity and has chronic underlying osteomyelitis. The patient was last seen in the office with what appeared to be worsening wound size felt secondary to the infection. There was concern for osteomyelitis and a wound culture was done that showed the following results:  Component 1/27/22 1938   Specimen Description . LEG    Special Requests NOT REPORTED    Direct Exam FEW NEUTROPHILS Abnormal     Direct Exam MODERATE GRAM POSITIVE COCCI IN PAIRS Abnormal     Direct Exam MODERATE GRAM POSITIVE RODS Abnormal     Direct Exam MODERATE GRAM NEGATIVE RODS Abnormal     Culture AEROMONAS HYDROPHILA/CAVIAE HEAVY GROWTH Abnormal     Culture MORGANELLA MORGANII HEAVY GROWTH Abnormal     Culture PROVIDENCIA (PROTEUS) RETTGERI HEAVY GROWTH Abnormal     Culture NORMAL SKIN DUSTIN HEAVY GROWTH Abnormal    Susceptibility     Aeromonas hydrophila/caviae Morganella morganii Providencia rettgeri     BACTERIAL SUSCEPTIBILITY PANEL FLACO BACTERIAL SUSCEPTIBILITY PANEL FLACO BACTERIAL SUSCEPTIBILITY PANEL FLACO     amikacin NOT REPORTED   NOT REPORTED   NOT REPORTED       ampicillin   >=32  Resistant <=2  Resistant     ampicillin-sulbactam NOT REPORTED   NOT REPORTED   NOT REPORTED       aztreonam   <=1  Sensitive <=1  Sensitive     ceFAZolin 32  Resistant >=64  Resistant <=4  Resistant     cefepime NOT REPORTED   NOT REPORTED   NOT REPORTED       cefTRIAXone   <=1  Sensitive <=1  Sensitive     ciprofloxacin 1  Sensitive <=0.25  Sensitive >=4  Resistant     ertapenem   NOT REPORTED   NOT REPORTED       gentamicin <=1  Sensitive <=1  Sensitive <=1  Sensitive     meropenem <=0.25  Sensitive NOT REPORTED   NOT REPORTED       nitrofurantoin   NOT REPORTED   NOT REPORTED       piperacillin-tazobactam <=4  Sensitive <=4  Sensitive <=4  Sensitive     tigecycline NOT REPORTED   NOT REPORTED   NOT REPORTED       tobramycin   <=1  Sensitive <=1  Sensitive     trimethoprim-sulfamethoxazole <=20  Sensitive <=20  Sensitive 40  Sensitive      The patient has been on oral antimicrobial therapy with bactrim and he reports that he has had good clinical response with improvement in his wound measurements and cellulitic changes. He did report that his sister did have pictures of the wound and will try make them available to us for us to review. The patient was very happy with the progress. We discussed the potential of him going to see a wound care specialist and being considered for muscle flap pain/surgical debridement of the wound and infected bone. The patient reports being followed previously in prior wound care centers and being told that there was not much that could be done. I affirm this is a Patient Initiated Episode with a Patient who has not had a related appointment within my department in the past 7 days or scheduled within the next 24 hours. Patient identification was verified at the start of the visit: Yes    Total Time: minutes: 11-20 minutes    Nash Bell was evaluated through a synchronous (real-time) audio encounter. The patient was located at home in a state where the provider was licensed to provide care.     Note: not billable if this call serves to triage the patient into an appointment for the relevant concern      Jessica Wolfe MD

## 2022-03-10 NOTE — TELEPHONE ENCOUNTER
Abad Cortez is a 74 y.o. male evaluated via telephone on 3/10/2022. Consent:  He and/or health care decision maker is aware that that he may receive a bill for this telephone service, which includes applicable co-pays, depending on his insurance coverage, and has provided verbal consent to proceed. Documentation:  I communicated with the patient and/or health care decision maker about the right lower extremity chronic wound, chronic osteomyelitis, and nonhealing wound. .   Details of this discussion including any medical advice provided:     I called [place] Dianne on the phone and we again discussed the right lower extremity wound. He tells me that it is doing much better there is now some granulation tissue/feeling in he reports that the wound edges. He remains on the Bactrim therapy he feels that the cellulitis has resolved. He continues to use Dakin's in the wound bed and he also feels that this has been debriding the wound well. We again discussed the needs to be seen by wound care specialist and he reports being seen by wound a few years ago and being told \"there was nothing much they could do for him\". I discussed with him that it would not hurt to get an opinion of a plastic surgeon/wound care specialist to see if there are any treatment options available and if they are not any that would be okay. The patient was agreeable to this. He did want some more Dakin's solution. I will renew the Dakin's and schedule him for visits with wound care and continue to assess his progress. The patient at this point in time seems to be happy with the progress that he is making and I did tell him that at times my concern is that the wound starts to degrade and worsen and we have to ensure that we are doing everything we can to ensure that he does not get any further complications in the right lower extremity.       I affirm this is a Patient Initiated Episode with a Patient who has not had a related appointment within my department in the past 7 days or scheduled within the next 24 hours. Patient identification was verified at the start of the visit: Yes    Total Time: minutes: 5-10 minutes    Jean Carlos Lara was evaluated through a synchronous (real-time) audio encounter. The patient was located at home in a state where the provider was licensed to provide care.     Note: not billable if this call serves to triage the patient into an appointment for the relevant concern      Amber Guerrero MD

## 2022-03-16 PROBLEM — I87.2 PERIPHERAL VENOUS INSUFFICIENCY: Status: ACTIVE | Noted: 2022-01-01

## 2022-03-16 PROBLEM — L97.914: Status: ACTIVE | Noted: 2022-01-01
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
## 2022-03-31 NOTE — LETTER
[address] [place] [address] and [place] [address]  Phone: [phone]  Fax: [phone]           Arpiat Ha MD      April 14, 2022     Patient: Raciel Castañeda   MR Number: [id number]   YOB: 1955   Date of Visit: 3/31/2022       Dear Dr. Black De Souza: Thank you for referring Guido Beal to me for evaluation/treatment. Below are the relevant portions of my assessment and plan of care. He has a large right leg wound with chronic osteomyelitis with a draining sinus. Notably, he reports he had 13 surgeries previously. Notably, he has a complex past medical history. He has a history of congestive heart failure and dilated cardiomyopathy with history of mitral valve replacement, atrial fibrillation, bilateral lower extremity edema with peripheral venous insufficiency, history of alcoholic liver disease and history of acute pancreatitis, and morbid obesity (BMI greater than 50). He is currently anticoagulated on Eliquis. We had a discussion today about the likely diagnosis and its natural history, physical exam and imaging findings, as well as various treatment options in detail. Surgically, we discussed multiple options, including an irrigation and debridement, as well as a below-knee amputation. We discussed that likely irrigation debridement would be unsuccessful, and I did recommend a below-knee amputation, if he wishes to proceed with any surgical option. At today's visit, he is adamant that he does not wish to proceed with surgery. We discussed the risks of developing a marjolin's ulcer (we discussed that this means cancer), as well as the risks of worsening of the infection, sepsis, and even death. The patient expressed verbal understanding, and wishes to continue with conservative management. Orders/referrals were placed as below at today's visit.      A dressing was placed at today's visit, and he may continue local wound care. All questions were answered and the above plan was agreed upon. The patient will return to clinic PRN . If you have questions, please do not hesitate to call me. I look forward to following Barbara Andino along with you.     Sincerely,        Regla Quiroz MD    CC providers:  Casey Mcdaniels, [address] [facility] [address] [place] [facility] [address]  [address] [employer]
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
## 2022-03-31 NOTE — LETTER
[address] [place] [address] and Sports [address] [address]  Phone: [phone]  Fax: [phone]    Zack Patel MD    April 14, 2022     Dahlia Silva, APRN - CNP  [address]    Patient: Cornel Lemus   MR Number: [id number]   YOB: 1955   Date of Visit: 3/31/2022       Dear Dahlia Silva: Thank you for referring Landry Ramirez to me for evaluation/treatment. Below are the relevant portions of my assessment and plan of care. He has a large right leg wound with chronic osteomyelitis with a draining sinus. Notably, he reports he had 13 surgeries previously. Notably, he has a complex past medical history. He has a history of congestive heart failure and dilated cardiomyopathy with history of mitral valve replacement, atrial fibrillation, bilateral lower extremity edema with peripheral venous insufficiency, history of alcoholic liver disease and history of acute pancreatitis, and morbid obesity (BMI greater than 50). He is currently anticoagulated on Eliquis. We had a discussion today about the likely diagnosis and its natural history, physical exam and imaging findings, as well as various treatment options in detail. Surgically, we discussed multiple options, including an irrigation and debridement, as well as a below-knee amputation. We discussed that likely irrigation debridement would be unsuccessful, and I did recommend a below-knee amputation, if he wishes to proceed with any surgical option. At today's visit, he is adamant that he does not wish to proceed with surgery. We discussed the risks of developing a marjolin's ulcer (we discussed that this means cancer), as well as the risks of worsening of the infection, sepsis, and even death. The patient expressed verbal understanding, and wishes to continue with conservative management.     Orders/referrals were placed as below at today's visit. A dressing was placed at today's visit, and he may continue local wound care. All questions were answered and the above plan was agreed upon. The patient will return to clinic PRN . If you have questions, please do not hesitate to call me. I look forward to following Leeann Yuan along with you.     Sincerely,      Orquidea Sinha MD

## 2022-09-20 ENCOUNTER — TELEPHONE (OUTPATIENT)
Dept: INFECTIOUS DISEASES | Age: 67
End: 2022-09-20

## 2022-09-20 NOTE — TELEPHONE ENCOUNTER
Pt's sister called to cancel his appt for tomorrow. She asked that I let you know that he passed away yesterday. The family thanks you for all that you did for him.